# Patient Record
Sex: MALE | Race: WHITE | NOT HISPANIC OR LATINO | URBAN - METROPOLITAN AREA
[De-identification: names, ages, dates, MRNs, and addresses within clinical notes are randomized per-mention and may not be internally consistent; named-entity substitution may affect disease eponyms.]

---

## 2017-02-15 ENCOUNTER — GENERIC CONVERSION - ENCOUNTER (OUTPATIENT)
Dept: OTHER | Facility: OTHER | Age: 82
End: 2017-02-15

## 2017-03-15 ENCOUNTER — ALLSCRIPTS OFFICE VISIT (OUTPATIENT)
Dept: OTHER | Facility: OTHER | Age: 82
End: 2017-03-15

## 2017-03-15 ENCOUNTER — TRANSCRIBE ORDERS (OUTPATIENT)
Dept: ADMINISTRATIVE | Facility: HOSPITAL | Age: 82
End: 2017-03-15

## 2017-03-15 ENCOUNTER — HOSPITAL ENCOUNTER (OUTPATIENT)
Dept: CT IMAGING | Facility: CLINIC | Age: 82
Discharge: HOME | End: 2017-03-15
Payer: COMMERCIAL

## 2017-03-15 ENCOUNTER — APPOINTMENT (OUTPATIENT)
Dept: CT IMAGING | Facility: CLINIC | Age: 82
End: 2017-03-15
Payer: COMMERCIAL

## 2017-03-15 DIAGNOSIS — R22.30 LOCALIZED SWELLING, MASS, OR LUMP OF UPPER EXTREMITY: ICD-10-CM

## 2017-03-15 DIAGNOSIS — R22.30 LOCALIZED SWELLING, MASS AND LUMP, UPPER LIMB, UNSPECIFIED LATERALITY: Primary | ICD-10-CM

## 2017-03-15 PROCEDURE — 71250 CT THORAX DX C-: CPT

## 2017-03-23 ENCOUNTER — ALLSCRIPTS OFFICE VISIT (OUTPATIENT)
Dept: OTHER | Facility: OTHER | Age: 82
End: 2017-03-23

## 2017-04-03 ENCOUNTER — HOSPITAL ENCOUNTER (OUTPATIENT)
Dept: RADIOLOGY | Facility: HOSPITAL | Age: 82
Discharge: HOME | End: 2017-04-03
Attending: SURGERY
Payer: COMMERCIAL

## 2017-04-03 VITALS
OXYGEN SATURATION: 93 % | HEIGHT: 66 IN | BODY MASS INDEX: 23.95 KG/M2 | DIASTOLIC BLOOD PRESSURE: 80 MMHG | HEART RATE: 82 BPM | SYSTOLIC BLOOD PRESSURE: 168 MMHG | TEMPERATURE: 98.4 F | WEIGHT: 149 LBS | RESPIRATION RATE: 16 BRPM

## 2017-04-03 DIAGNOSIS — R22.30: ICD-10-CM

## 2017-04-03 LAB
ERYTHROCYTE [DISTWIDTH] IN BLOOD BY AUTOMATED COUNT: 14.5 % (ref 11.6–15.1)
HCT VFR BLD AUTO: 40.6 % (ref 36.5–49.3)
HGB BLD-MCNC: 13.8 G/DL (ref 12–17)
MCH RBC QN AUTO: 31.5 PG (ref 26.8–34.3)
MCHC RBC AUTO-ENTMCNC: 34 G/DL (ref 31.4–37.4)
MCV RBC AUTO: 93 FL (ref 82–98)
PLATELET # BLD AUTO: 407 THOUSANDS/UL (ref 149–390)
PMV BLD AUTO: 8.9 FL (ref 8.9–12.7)
RBC # BLD AUTO: 4.38 MILLION/UL (ref 3.88–5.62)
WBC # BLD AUTO: 10.5 THOUSAND/UL (ref 4.31–10.16)

## 2017-04-03 PROCEDURE — 88333 PATH CONSLTJ SURG CYTO XM 1: CPT | Performed by: SURGERY

## 2017-04-03 PROCEDURE — 76942 ECHO GUIDE FOR BIOPSY: CPT

## 2017-04-03 PROCEDURE — 88305 TISSUE EXAM BY PATHOLOGIST: CPT | Performed by: SURGERY

## 2017-04-03 PROCEDURE — 38505 NEEDLE BIOPSY LYMPH NODES: CPT

## 2017-04-03 PROCEDURE — 85027 COMPLETE CBC AUTOMATED: CPT | Performed by: RADIOLOGY

## 2017-04-03 RX ORDER — ACETAMINOPHEN 325 MG/1
325 TABLET ORAL EVERY 6 HOURS PRN
Status: DISCONTINUED | OUTPATIENT
Start: 2017-04-03 | End: 2017-04-04 | Stop reason: HOSPADM

## 2017-04-03 RX ORDER — FENTANYL CITRATE 50 UG/ML
INJECTION, SOLUTION INTRAMUSCULAR; INTRAVENOUS CODE/TRAUMA/SEDATION MEDICATION
Status: COMPLETED | OUTPATIENT
Start: 2017-04-03 | End: 2017-04-03

## 2017-04-03 RX ORDER — ALBUTEROL SULFATE 2.5 MG/3ML
2.5 SOLUTION RESPIRATORY (INHALATION) EVERY 6 HOURS PRN
COMMUNITY

## 2017-04-03 RX ORDER — TRIAMCINOLONE ACETONIDE 0.25 MG/G
CREAM TOPICAL 2 TIMES DAILY
COMMUNITY

## 2017-04-03 RX ORDER — ATORVASTATIN CALCIUM 10 MG/1
10 TABLET, FILM COATED ORAL DAILY
COMMUNITY

## 2017-04-03 RX ORDER — SODIUM CHLORIDE 9 MG/ML
75 INJECTION, SOLUTION INTRAVENOUS CONTINUOUS
Status: DISCONTINUED | OUTPATIENT
Start: 2017-04-03 | End: 2017-04-04 | Stop reason: HOSPADM

## 2017-04-03 RX ORDER — MOMETASONE FUROATE 50 UG/1
2 SPRAY, METERED NASAL DAILY
COMMUNITY

## 2017-04-03 RX ORDER — ERYTHROMYCIN 5 MG/G
0.5 OINTMENT OPHTHALMIC
COMMUNITY

## 2017-04-03 RX ORDER — HYDROCHLOROTHIAZIDE 25 MG/1
25 TABLET ORAL DAILY
COMMUNITY

## 2017-04-03 RX ORDER — ALLOPURINOL 300 MG/1
300 TABLET ORAL DAILY
COMMUNITY

## 2017-04-03 RX ADMIN — SODIUM CHLORIDE 75 ML/HR: 0.9 INJECTION, SOLUTION INTRAVENOUS at 07:04

## 2017-04-03 RX ADMIN — FENTANYL CITRATE 50 MCG: 50 INJECTION, SOLUTION INTRAMUSCULAR; INTRAVENOUS at 08:36

## 2017-04-17 ENCOUNTER — HOSPITAL ENCOUNTER (OUTPATIENT)
Dept: RADIOLOGY | Facility: HOSPITAL | Age: 82
Discharge: HOME | End: 2017-04-17
Attending: SURGERY
Payer: COMMERCIAL

## 2017-04-17 ENCOUNTER — HOSPITAL ENCOUNTER (OUTPATIENT)
Dept: NON INVASIVE DIAGNOSTICS | Facility: HOSPITAL | Age: 82
Discharge: HOME | End: 2017-04-17
Attending: SURGERY
Payer: COMMERCIAL

## 2017-04-17 ENCOUNTER — TRANSCRIBE ORDERS (OUTPATIENT)
Dept: ADMINISTRATIVE | Facility: HOSPITAL | Age: 82
End: 2017-04-17

## 2017-04-17 ENCOUNTER — APPOINTMENT (OUTPATIENT)
Dept: LAB | Facility: HOSPITAL | Age: 82
End: 2017-04-17
Attending: SURGERY
Payer: COMMERCIAL

## 2017-04-17 ENCOUNTER — ALLSCRIPTS OFFICE VISIT (OUTPATIENT)
Dept: OTHER | Facility: OTHER | Age: 82
End: 2017-04-17

## 2017-04-17 DIAGNOSIS — R22.30 LOCALIZED SWELLING, MASS AND LUMP, UPPER LIMB, UNSPECIFIED LATERALITY: ICD-10-CM

## 2017-04-17 DIAGNOSIS — R22.30 LOCALIZED SWELLING, MASS AND LUMP, UPPER LIMB, UNSPECIFIED LATERALITY: Primary | ICD-10-CM

## 2017-04-17 LAB
ALBUMIN SERPL BCP-MCNC: 3.9 G/DL (ref 3.5–5)
ALP SERPL-CCNC: 86 U/L (ref 46–116)
ALT SERPL W P-5'-P-CCNC: 23 U/L (ref 12–78)
ANION GAP SERPL CALCULATED.3IONS-SCNC: 5 MMOL/L (ref 4–13)
APTT PPP: 30 SECONDS (ref 24–36)
AST SERPL W P-5'-P-CCNC: 28 U/L (ref 5–45)
ATRIAL RATE: 82 BPM
BASOPHILS # BLD AUTO: 0.03 THOUSANDS/ΜL (ref 0–0.1)
BASOPHILS NFR BLD AUTO: 0 % (ref 0–1)
BILIRUB DIRECT SERPL-MCNC: 0.19 MG/DL (ref 0–0.2)
BILIRUB SERPL-MCNC: 0.6 MG/DL (ref 0.2–1)
BUN SERPL-MCNC: 10 MG/DL (ref 5–25)
CALCIUM SERPL-MCNC: 8.8 MG/DL (ref 8.3–10.1)
CEA SERPL-MCNC: 1.4 NG/ML (ref 0–3)
CHLORIDE SERPL-SCNC: 100 MMOL/L (ref 100–108)
CO2 SERPL-SCNC: 37 MMOL/L (ref 21–32)
CREAT SERPL-MCNC: 1 MG/DL (ref 0.6–1.3)
EOSINOPHIL # BLD AUTO: 0.15 THOUSAND/ΜL (ref 0–0.61)
EOSINOPHIL NFR BLD AUTO: 1 % (ref 0–6)
ERYTHROCYTE [DISTWIDTH] IN BLOOD BY AUTOMATED COUNT: 14.6 % (ref 11.6–15.1)
EST. AVERAGE GLUCOSE BLD GHB EST-MCNC: 114 MG/DL
GFR SERPL CREATININE-BSD FRML MDRD: >60 ML/MIN/1.73SQ M
GLUCOSE SERPL-MCNC: 106 MG/DL (ref 65–140)
HBA1C MFR BLD: 5.6 % (ref 4.2–6.3)
HCT VFR BLD AUTO: 42.1 % (ref 36.5–49.3)
HGB BLD-MCNC: 13.9 G/DL (ref 12–17)
INR PPP: 0.97 (ref 0.86–1.16)
LYMPHOCYTES # BLD AUTO: 1.46 THOUSANDS/ΜL (ref 0.6–4.47)
LYMPHOCYTES NFR BLD AUTO: 12 % (ref 14–44)
MCH RBC QN AUTO: 30.7 PG (ref 26.8–34.3)
MCHC RBC AUTO-ENTMCNC: 33 G/DL (ref 31.4–37.4)
MCV RBC AUTO: 93 FL (ref 82–98)
MONOCYTES # BLD AUTO: 0.72 THOUSAND/ΜL (ref 0.17–1.22)
MONOCYTES NFR BLD AUTO: 6 % (ref 4–12)
NEUTROPHILS # BLD AUTO: 10.38 THOUSANDS/ΜL (ref 1.85–7.62)
NEUTS SEG NFR BLD AUTO: 81 % (ref 43–75)
P AXIS: 73 DEGREES
PLATELET # BLD AUTO: 447 THOUSANDS/UL (ref 149–390)
PMV BLD AUTO: 8.6 FL (ref 8.9–12.7)
POTASSIUM SERPL-SCNC: 3.1 MMOL/L (ref 3.5–5.3)
PR INTERVAL: 174 MS
PROT SERPL-MCNC: 7.4 G/DL (ref 6.4–8.2)
PROTHROMBIN TIME: 12.8 SECONDS (ref 12–14.3)
QRS AXIS: 27 DEGREES
QRSD INTERVAL: 88 MS
QT INTERVAL: 374 MS
QTC INTERVAL: 436 MS
RBC # BLD AUTO: 4.53 MILLION/UL (ref 3.88–5.62)
SODIUM SERPL-SCNC: 142 MMOL/L (ref 136–145)
T WAVE AXIS: 65 DEGREES
VENTRICULAR RATE: 82 BPM
WBC # BLD AUTO: 12.74 THOUSAND/UL (ref 4.31–10.16)

## 2017-04-17 PROCEDURE — 85730 THROMBOPLASTIN TIME PARTIAL: CPT

## 2017-04-17 PROCEDURE — 85610 PROTHROMBIN TIME: CPT

## 2017-04-17 PROCEDURE — 36415 COLL VENOUS BLD VENIPUNCTURE: CPT

## 2017-04-17 PROCEDURE — 80053 COMPREHEN METABOLIC PANEL: CPT

## 2017-04-17 PROCEDURE — 82248 BILIRUBIN DIRECT: CPT

## 2017-04-17 PROCEDURE — 83036 HEMOGLOBIN GLYCOSYLATED A1C: CPT

## 2017-04-17 PROCEDURE — 82378 CARCINOEMBRYONIC ANTIGEN: CPT

## 2017-04-17 PROCEDURE — 93005 ELECTROCARDIOGRAM TRACING: CPT

## 2017-04-17 PROCEDURE — 85025 COMPLETE CBC W/AUTO DIFF WBC: CPT

## 2017-04-17 PROCEDURE — 71020 HB CHEST X-RAY 2VW FRONTAL&LATL: CPT

## 2017-04-24 RX ORDER — HYDROCODONE BITARTRATE AND ACETAMINOPHEN 5; 325 MG/1; MG/1
1 TABLET ORAL EVERY 6 HOURS PRN
COMMUNITY
End: 2018-02-06 | Stop reason: ALTCHOICE

## 2017-04-25 ENCOUNTER — ANESTHESIA EVENT (OUTPATIENT)
Dept: PERIOP | Facility: HOSPITAL | Age: 82
End: 2017-04-25
Payer: COMMERCIAL

## 2017-04-25 ENCOUNTER — ANESTHESIA (OUTPATIENT)
Dept: PERIOP | Facility: HOSPITAL | Age: 82
End: 2017-04-25
Payer: COMMERCIAL

## 2017-04-25 ENCOUNTER — HOSPITAL ENCOUNTER (OUTPATIENT)
Facility: HOSPITAL | Age: 82
Setting detail: OUTPATIENT SURGERY
Discharge: HOME | End: 2017-04-25
Attending: SURGERY | Admitting: SURGERY
Payer: COMMERCIAL

## 2017-04-25 VITALS
RESPIRATION RATE: 16 BRPM | BODY MASS INDEX: 24.11 KG/M2 | SYSTOLIC BLOOD PRESSURE: 152 MMHG | WEIGHT: 150 LBS | HEIGHT: 66 IN | HEART RATE: 91 BPM | TEMPERATURE: 98.5 F | OXYGEN SATURATION: 95 % | DIASTOLIC BLOOD PRESSURE: 70 MMHG

## 2017-04-25 DIAGNOSIS — R22.30 LOCALIZED SWELLING, MASS, OR LUMP OF UPPER EXTREMITY: ICD-10-CM

## 2017-04-25 LAB — POTASSIUM SERPL-SCNC: 5.2 MMOL/L (ref 3.5–5.3)

## 2017-04-25 PROCEDURE — 88331 PATH CONSLTJ SURG 1 BLK 1SPC: CPT | Performed by: SURGERY

## 2017-04-25 PROCEDURE — 88307 TISSUE EXAM BY PATHOLOGIST: CPT | Performed by: SURGERY

## 2017-04-25 PROCEDURE — 84132 ASSAY OF SERUM POTASSIUM: CPT | Performed by: PHYSICIAN ASSISTANT

## 2017-04-25 PROCEDURE — 88305 TISSUE EXAM BY PATHOLOGIST: CPT | Performed by: SURGERY

## 2017-04-25 PROCEDURE — 88333 PATH CONSLTJ SURG CYTO XM 1: CPT | Performed by: SURGERY

## 2017-04-25 RX ORDER — FENTANYL CITRATE/PF 50 MCG/ML
50 SYRINGE (ML) INJECTION
Status: DISCONTINUED | OUTPATIENT
Start: 2017-04-25 | End: 2017-04-25 | Stop reason: HOSPADM

## 2017-04-25 RX ORDER — FENTANYL CITRATE 50 UG/ML
INJECTION, SOLUTION INTRAMUSCULAR; INTRAVENOUS AS NEEDED
Status: DISCONTINUED | OUTPATIENT
Start: 2017-04-25 | End: 2017-04-25 | Stop reason: SURG

## 2017-04-25 RX ORDER — FENTANYL CITRATE/PF 50 MCG/ML
25 SYRINGE (ML) INJECTION
Status: COMPLETED | OUTPATIENT
Start: 2017-04-25 | End: 2017-04-25

## 2017-04-25 RX ORDER — FLUOROURACIL 5 MG/G
CREAM TOPICAL DAILY
COMMUNITY

## 2017-04-25 RX ORDER — ACETAMINOPHEN 325 MG/1
650 TABLET ORAL EVERY 6 HOURS PRN
Status: DISCONTINUED | OUTPATIENT
Start: 2017-04-25 | End: 2017-04-25 | Stop reason: HOSPADM

## 2017-04-25 RX ORDER — ONDANSETRON 2 MG/ML
4 INJECTION INTRAMUSCULAR; INTRAVENOUS EVERY 6 HOURS PRN
Status: DISCONTINUED | OUTPATIENT
Start: 2017-04-25 | End: 2017-04-25 | Stop reason: HOSPADM

## 2017-04-25 RX ORDER — HYDROCODONE BITARTRATE AND ACETAMINOPHEN 5; 325 MG/1; MG/1
1-2 TABLET ORAL EVERY 4 HOURS PRN
Qty: 30 TABLET | Refills: 0 | Status: SHIPPED | OUTPATIENT
Start: 2017-04-25 | End: 2017-05-05

## 2017-04-25 RX ORDER — GLYCOPYRROLATE 0.2 MG/ML
INJECTION INTRAMUSCULAR; INTRAVENOUS AS NEEDED
Status: DISCONTINUED | OUTPATIENT
Start: 2017-04-25 | End: 2017-04-25 | Stop reason: SURG

## 2017-04-25 RX ORDER — SODIUM CHLORIDE, SODIUM LACTATE, POTASSIUM CHLORIDE, CALCIUM CHLORIDE 600; 310; 30; 20 MG/100ML; MG/100ML; MG/100ML; MG/100ML
125 INJECTION, SOLUTION INTRAVENOUS CONTINUOUS
Status: DISCONTINUED | OUTPATIENT
Start: 2017-04-25 | End: 2017-04-25 | Stop reason: HOSPADM

## 2017-04-25 RX ORDER — MIDAZOLAM HYDROCHLORIDE 1 MG/ML
INJECTION INTRAMUSCULAR; INTRAVENOUS AS NEEDED
Status: DISCONTINUED | OUTPATIENT
Start: 2017-04-25 | End: 2017-04-25 | Stop reason: SURG

## 2017-04-25 RX ORDER — PROPOFOL 10 MG/ML
INJECTION, EMULSION INTRAVENOUS AS NEEDED
Status: DISCONTINUED | OUTPATIENT
Start: 2017-04-25 | End: 2017-04-25 | Stop reason: SURG

## 2017-04-25 RX ORDER — HYDROCODONE BITARTRATE AND ACETAMINOPHEN 5; 325 MG/1; MG/1
2 TABLET ORAL EVERY 6 HOURS PRN
Status: DISCONTINUED | OUTPATIENT
Start: 2017-04-25 | End: 2017-04-25 | Stop reason: HOSPADM

## 2017-04-25 RX ORDER — HYDROCODONE BITARTRATE AND ACETAMINOPHEN 5; 325 MG/1; MG/1
1 TABLET ORAL EVERY 6 HOURS PRN
Status: DISCONTINUED | OUTPATIENT
Start: 2017-04-25 | End: 2017-04-25 | Stop reason: HOSPADM

## 2017-04-25 RX ORDER — ONDANSETRON 2 MG/ML
4 INJECTION INTRAMUSCULAR; INTRAVENOUS ONCE
Status: DISCONTINUED | OUTPATIENT
Start: 2017-04-25 | End: 2017-04-25 | Stop reason: HOSPADM

## 2017-04-25 RX ADMIN — MIDAZOLAM HYDROCHLORIDE 2 MG: 1 INJECTION, SOLUTION INTRAMUSCULAR; INTRAVENOUS at 09:43

## 2017-04-25 RX ADMIN — FENTANYL CITRATE 25 MCG: 50 INJECTION, SOLUTION INTRAMUSCULAR; INTRAVENOUS at 09:41

## 2017-04-25 RX ADMIN — SODIUM CHLORIDE, SODIUM LACTATE, POTASSIUM CHLORIDE, AND CALCIUM CHLORIDE 125 ML/HR: .6; .31; .03; .02 INJECTION, SOLUTION INTRAVENOUS at 08:12

## 2017-04-25 RX ADMIN — FENTANYL CITRATE 25 MCG: 50 INJECTION, SOLUTION INTRAMUSCULAR; INTRAVENOUS at 11:41

## 2017-04-25 RX ADMIN — FENTANYL CITRATE 25 MCG: 50 INJECTION, SOLUTION INTRAMUSCULAR; INTRAVENOUS at 11:16

## 2017-04-25 RX ADMIN — GLYCOPYRROLATE 0.2 MG: 0.2 INJECTION, SOLUTION INTRAMUSCULAR; INTRAVENOUS at 09:44

## 2017-04-25 RX ADMIN — FENTANYL CITRATE 25 MCG: 50 INJECTION, SOLUTION INTRAMUSCULAR; INTRAVENOUS at 11:48

## 2017-04-25 RX ADMIN — PROPOFOL 100 MG: 10 INJECTION, EMULSION INTRAVENOUS at 09:44

## 2017-04-25 RX ADMIN — FENTANYL CITRATE 25 MCG: 50 INJECTION, SOLUTION INTRAMUSCULAR; INTRAVENOUS at 11:55

## 2017-04-25 RX ADMIN — FENTANYL CITRATE 25 MCG: 50 INJECTION, SOLUTION INTRAMUSCULAR; INTRAVENOUS at 11:35

## 2017-04-25 RX ADMIN — FENTANYL CITRATE 50 MCG: 50 INJECTION, SOLUTION INTRAMUSCULAR; INTRAVENOUS at 09:54

## 2017-04-25 RX ADMIN — FENTANYL CITRATE 25 MCG: 50 INJECTION, SOLUTION INTRAMUSCULAR; INTRAVENOUS at 10:57

## 2017-04-25 RX ADMIN — FENTANYL CITRATE 25 MCG: 50 INJECTION, SOLUTION INTRAMUSCULAR; INTRAVENOUS at 11:00

## 2017-04-25 RX ADMIN — CEFAZOLIN SODIUM 2000 MG: 2 SOLUTION INTRAVENOUS at 09:46

## 2017-04-25 RX ADMIN — HYDROCODONE BITARTRATE AND ACETAMINOPHEN 1 TABLET: 5; 325 TABLET ORAL at 12:14

## 2017-04-25 RX ADMIN — FENTANYL CITRATE 50 MCG: 50 INJECTION, SOLUTION INTRAMUSCULAR; INTRAVENOUS at 10:47

## 2017-05-08 ENCOUNTER — ALLSCRIPTS OFFICE VISIT (OUTPATIENT)
Dept: OTHER | Facility: OTHER | Age: 82
End: 2017-05-08

## 2017-05-15 ENCOUNTER — ALLSCRIPTS OFFICE VISIT (OUTPATIENT)
Dept: OTHER | Facility: OTHER | Age: 82
End: 2017-05-15

## 2017-06-01 ENCOUNTER — ALLSCRIPTS OFFICE VISIT (OUTPATIENT)
Dept: OTHER | Facility: OTHER | Age: 82
End: 2017-06-01

## 2017-06-01 ENCOUNTER — TRANSCRIBE ORDERS (OUTPATIENT)
Dept: ADMINISTRATIVE | Facility: HOSPITAL | Age: 82
End: 2017-06-01

## 2017-06-01 DIAGNOSIS — R22.30 LOCALIZED SWELLING, MASS, OR LUMP OF UPPER EXTREMITY: ICD-10-CM

## 2017-06-01 DIAGNOSIS — R22.30 LOCALIZED SWELLING, MASS AND LUMP, UPPER LIMB, UNSPECIFIED LATERALITY: Primary | ICD-10-CM

## 2017-06-09 ENCOUNTER — HOSPITAL ENCOUNTER (OUTPATIENT)
Dept: RADIOLOGY | Age: 82
Discharge: HOME | End: 2017-06-09
Payer: COMMERCIAL

## 2017-06-09 DIAGNOSIS — C77.9 METASTATIC SQUAMOUS CELL CARCINOMA TO LYMPH NODE (HCC): ICD-10-CM

## 2017-06-09 LAB — GLUCOSE SERPL-MCNC: 106 MG/DL (ref 65–140)

## 2017-06-09 PROCEDURE — 82948 REAGENT STRIP/BLOOD GLUCOSE: CPT

## 2017-06-09 PROCEDURE — 78815 PET IMAGE W/CT SKULL-THIGH: CPT

## 2017-06-09 PROCEDURE — A9552 F18 FDG: HCPCS

## 2017-06-09 RX ADMIN — IOHEXOL 5 ML: 240 INJECTION, SOLUTION INTRATHECAL; INTRAVASCULAR; INTRAVENOUS; ORAL at 11:10

## 2017-06-22 ENCOUNTER — ALLSCRIPTS OFFICE VISIT (OUTPATIENT)
Dept: OTHER | Facility: OTHER | Age: 82
End: 2017-06-22

## 2017-06-28 ENCOUNTER — LAB CONVERSION - ENCOUNTER (OUTPATIENT)
Dept: OTHER | Facility: OTHER | Age: 82
End: 2017-06-28

## 2017-06-28 LAB
COMMENT (HISTORICAL): NORMAL
DIAGNOSIS (HISTORICAL): NORMAL
GROSS DESCRIPTION (HISTORICAL): NORMAL
PATHOLOGIST (HISTORICAL): NORMAL
PLEASE NOTE (HISTORICAL): NORMAL
PROCEDURE (HISTORICAL): NORMAL
SCREEN/SCREENER (HISTORICAL): NORMAL
SITE/SOURCE (HISTORICAL): NORMAL

## 2017-07-06 ENCOUNTER — ALLSCRIPTS OFFICE VISIT (OUTPATIENT)
Dept: OTHER | Facility: OTHER | Age: 82
End: 2017-07-06

## 2017-07-11 ENCOUNTER — GENERIC CONVERSION - ENCOUNTER (OUTPATIENT)
Dept: OTHER | Facility: OTHER | Age: 82
End: 2017-07-11

## 2017-07-17 ENCOUNTER — GENERIC CONVERSION - ENCOUNTER (OUTPATIENT)
Dept: OTHER | Facility: OTHER | Age: 82
End: 2017-07-17

## 2017-07-20 ENCOUNTER — GENERIC CONVERSION - ENCOUNTER (OUTPATIENT)
Dept: OTHER | Facility: OTHER | Age: 82
End: 2017-07-20

## 2017-07-27 ENCOUNTER — GENERIC CONVERSION - ENCOUNTER (OUTPATIENT)
Dept: OTHER | Facility: OTHER | Age: 82
End: 2017-07-27

## 2017-07-29 ENCOUNTER — TRANSCRIBE ORDERS (OUTPATIENT)
Dept: ADMINISTRATIVE | Facility: HOSPITAL | Age: 82
End: 2017-07-29

## 2017-07-29 ENCOUNTER — APPOINTMENT (OUTPATIENT)
Dept: LAB | Facility: CLINIC | Age: 82
End: 2017-07-29
Payer: COMMERCIAL

## 2017-07-29 DIAGNOSIS — J45.909 EXTRINSIC ASTHMA, UNSPECIFIED: ICD-10-CM

## 2017-07-29 DIAGNOSIS — Z12.5 SPECIAL SCREENING FOR MALIGNANT NEOPLASM OF PROSTATE: ICD-10-CM

## 2017-07-29 DIAGNOSIS — E79.0 URICACIDEMIA: ICD-10-CM

## 2017-07-29 DIAGNOSIS — C79.9 METASTATIC CANCER (HCC): ICD-10-CM

## 2017-07-29 DIAGNOSIS — I10 ESSENTIAL HYPERTENSION, MALIGNANT: ICD-10-CM

## 2017-07-29 DIAGNOSIS — J45.909 EXTRINSIC ASTHMA, UNSPECIFIED: Primary | ICD-10-CM

## 2017-07-29 DIAGNOSIS — E78.5 OTHER AND UNSPECIFIED HYPERLIPIDEMIA: ICD-10-CM

## 2017-07-29 LAB
CHOLEST SERPL-MCNC: 117 MG/DL (ref 50–200)
HDLC SERPL-MCNC: 46 MG/DL (ref 40–60)
LDLC SERPL CALC-MCNC: 47 MG/DL (ref 0–100)
PSA SERPL-MCNC: 8.2 NG/ML (ref 0–4)
TRIGL SERPL-MCNC: 119 MG/DL
URATE SERPL-MCNC: 5 MG/DL (ref 4.2–8)

## 2017-07-29 PROCEDURE — 80061 LIPID PANEL: CPT

## 2017-07-29 PROCEDURE — 84153 ASSAY OF PSA TOTAL: CPT

## 2017-07-29 PROCEDURE — 36415 COLL VENOUS BLD VENIPUNCTURE: CPT

## 2017-07-29 PROCEDURE — 84550 ASSAY OF BLOOD/URIC ACID: CPT

## 2017-08-23 ENCOUNTER — GENERIC CONVERSION - ENCOUNTER (OUTPATIENT)
Dept: OTHER | Facility: OTHER | Age: 82
End: 2017-08-23

## 2017-08-29 ENCOUNTER — ALLSCRIPTS OFFICE VISIT (OUTPATIENT)
Dept: OTHER | Facility: OTHER | Age: 82
End: 2017-08-29

## 2017-08-31 ENCOUNTER — GENERIC CONVERSION - ENCOUNTER (OUTPATIENT)
Dept: OTHER | Facility: OTHER | Age: 82
End: 2017-08-31

## 2017-10-02 ENCOUNTER — ALLSCRIPTS OFFICE VISIT (OUTPATIENT)
Dept: OTHER | Facility: OTHER | Age: 82
End: 2017-10-02

## 2017-10-04 ENCOUNTER — LAB CONVERSION - ENCOUNTER (OUTPATIENT)
Dept: OTHER | Facility: OTHER | Age: 82
End: 2017-10-04

## 2017-10-04 LAB
ADDITIONAL INFORMATION (HISTORICAL): NORMAL
DIAGNOSIS (HISTORICAL): NORMAL
DIAGNOSIS (HISTORICAL): NORMAL
GROSS DESCRIPTION (HISTORICAL): NORMAL
MICRO DESCRIPTION (HISTORICAL): NORMAL
MICRO DESCRIPTION (HISTORICAL): NORMAL
PATHOLOGIST (HISTORICAL): NORMAL
PROCEDURE (HISTORICAL): NORMAL
PROCEDURE (HISTORICAL): NORMAL
SITE/SOURCE (HISTORICAL): NORMAL
SOURCE (HISTORICAL): NORMAL

## 2017-10-05 NOTE — PROGRESS NOTES
Assessment  1  Axillary mass (782 2) (R22 30)    Plan  Axillary mass    · (Q) TISSUE PATHOLOGY; Status:Active; Requested [facility]:[id number];    Perform:[facility]; Order Comments:Specimen A): Skin biopsy of right axillaSpecimen B): Curette biopsy of deep right axilla tissue; RLS:[id number]; Ordered; For:Axillary mass; Ordered By:Jacob Hilton;    Discussion/Summary  Discussion Summary:   Maricruz Reid is an 80year old male who presents today status post excision of right axillary mass done on 4/25/17 for [facility]  The wound had not been packed because he believed it would prevent drainage  Explained that it will not heal properly without packing because it is a deep cavity  The wound was probed and because the fluid and tissue had a cystic quality to it there was concern for recurrent squamous cell cancer, because his cancer also had a cystic component on pathology  He consented to proceed with skin biopsy and deep curettage of his right axilla wound  The tissue appeared friable and a cystic quality to it which further caused concern for squamous cell carcinoma  He will attempt to set up a wound care appointment and to see visiting nurses for further wound care with packing  The possibility of cancer was discussed with the patient and his wife  We will wait for the results of the biopsies, and plan accordingly from the pathology  He knows to contact our office if any problems or concerns arise  Chief Complaint  Chief Complaint Free Text Note Form: Seroma check  History of Present Illness  HPI: Maricruz Reid is an 80year old male who presents today status post excision of right axillary mass done on 4/25/17 for [facility]  He then developed a seroma that was aspirated on 7/6/17 and returns for recheck  He states that the wound exploded open and blew up after a few treatments of chemotherapy on 7/16/17  He claims the wound drained into the sink for a half an hour  He went to [facility], and would care   He spent three days in the hospital and they did not find any infection  At the wound center they have been using gauze topically and have not been packing the wound at his request  The patient is febrile today with a temperature of 100 2 degrees Fahrenheit (checked twice)  He denies any previous fever, chills, or night sweats  also has been receiving chemotherapy and radiation with Dr Katarina Peterson  He is finished with treatment and is scheduled for a PET scan in December  Review of Systems  Complete-Male:   Constitutional: fever, but-as noted in HPI-and-no chills  Eyes: No complaints of eye pain, no red eyes, no discharge from eyes, no itchy eyes  ENT: no complaints of earache, no hearing loss, no nosebleeds, no nasal discharge, no sore throat, no hoarseness  Cardiovascular: No complaints of slow heart rate, no fast heart rate, no chest pain, no palpitations, no leg claudication, no lower extremity  Respiratory: No complaints of shortness of breath, no wheezing, no cough, no SOB on exertion, no orthopnea or PND  Gastrointestinal: No complaints of abdominal pain, no constipation, no nausea or vomiting, no diarrhea or bloody stools  Genitourinary: No complaints of dysuria, no incontinence, no hesitancy, no nocturia, no genital lesion, no testicular pain  Musculoskeletal: No complaints of arthralgia, no myalgias, no joint swelling or stiffness, no limb pain or swelling  Integumentary: skin wound, but-as noted in HPI  Neurological: No compliants of headache, no confusion, no convulsions, no numbness or tingling, no dizziness or fainting, no limb weakness, no difficulty walking  Psychiatric: Is not suicidal, no sleep disturbances, no anxiety or depression, no change in personality, no emotional problems  Endocrine: No complaints of proptosis, no hot flashes, no muscle weakness, no erectile dysfunction, no deepening of the voice, no feelings of weakness     Hematologic/Lymphatic: No complaints of swollen glands, no swollen glands in the neck, does not bleed easily, no easy bruising  ROS Reviewed:   ROS reviewed  Active Problems  1  Allergic rhinitis (477 9) (J30 9)   2  Asthma (493 90) (J45 909)   3  Axillary mass (782 2) (R22 30)   4  Benign essential hypertension (401 1) (I10)   5  Benign Localized Prostatic Hyperplasia Without Urinary Obstruction With Other Lower   Urinary Tract Symptoms (600 20)   6  Chronic obstructive pulmonary disease (496) (J44 9)   7  Hearing Loss (389 9)   8  Hyperlipidemia (272 4) (E78 5)   9  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)   10  Hypokalemia (276 8) (E87 6)   11  Metastatic squamous cell carcinoma (199 1) (C79 9,C80 1)   12  Postherpetic neuralgia (053 19) (B02 29)   13  Prostate cancer screening (V76 44) (Z12 5)   14  Right inguinal hernia (550 90) (K40 90)   15  Seroma, post-traumatic (729 91) (T79 2XXA)   16  Thyroid Nodule    Past Medical History  1  History of Flu vaccine need (V04 81) (Z23)   2  History of Herpes zoster with ophthalmic complication, unspecified herpes zoster eye   disease (053 29) (B02 30)   3  History of goiter (V12 29) (Z86 39)   4  History of thyroid nodule (V12 29) (Z86 39)   5  History of Malignant Skin Neoplasm (V10 83)   6  History of Prostate cancer screening (V76 44) (Z12 5)   7  History of Screen for colon cancer (V76 51) (Z12 11)  Active Problems And Past Medical History Reviewed: The active problems and past medical history were reviewed and updated today  Surgical History  1  History of Cataract Surgery   2  History of Colonoscopic Polypectomy Via Colostomy   3  History of Laminectomy Lumbar   4  History of Partial Colectomy - Sigmoid  Surgical History Reviewed: The surgical history was reviewed and updated today  Family History  Mother    1  Family history of Renal failure  Father    2  Family history of Lung cancer   3   No family history of mental disorder  Family History Reviewed: The family history was reviewed and updated today  Social History   · Being A Social Drinker   · Denies alcohol use causing problems   · Former smoker (V15 82) (L23 532)   · Marital History - Currently    · Occupation: Retired   · Sedentary Lifestyle (V69 0)  Social History Reviewed: The social history was reviewed and updated today  The social history was reviewed and is unchanged  Current Meds   1  Advair -21 MCG/ACT Inhalation Aerosol; INHALE 2 PUFFS,  BY MOUTH, TWICE   DAILY Recorded   2  Allopurinol 300 MG Oral Tablet; take 1 tablet by mouth daily as directed; Therapy: 41XOF4711 to (Sathya Morejon)  Requested for: 17RPA9012; Last   Rx:10Mar2017 Ordered   3  Atorvastatin Calcium 10 MG Oral Tablet; TAKE 1 TABLET DAILY; Therapy: 21CMO4999 to (Sathya Morejon)  Requested for: 48JRS9159; Last   Rx:10Mar2017 Ordered   4  HydroCHLOROthiazide 25 MG Oral Tablet; TAKE 1 TABLET DAILY; Therapy: 07STC9288 to (Sathya Morejon)  Requested for: 39XTY4094; Last   Rx:10Mar2017 Ordered   5  Ipratropium-Albuterol 0 5-2 5 (3) MG/3ML Inhalation Solution; USE 1 UNIT DOSE IN   NEBULIZER EVERY 4 HOURS AS NEEDED Recorded   6  Klor-Con M20 20 MEQ Oral Tablet Extended Release; TAKE 1 TABLET 3 TIMES DAILY; Therapy: (Shannan Sorensen) to Recorded   7  LORazepam 0 5 MG Oral Tablet; 1/2-1 tablet a m  and p m; Therapy: 67JZO2244 to (Evaluate:10Nov2016); Last Rx:11Oct2016 Ordered   8  Magnesium Oxide 400 MG Oral Tablet; TAKE 1 TABLET 3 times daily; Therapy: (Shannan Sorensen) to Recorded   9  Nasonex SUSP; USE 2 SPRAYS IN EACH NOSTRIL ONCE DAILY Recorded   10  TraMADol HCl - 50 MG Oral Tablet; TAKE 1 TABLET Every 6 hours; Therapy: 43FEO6080 to (Evaluate:22Oct2016); Last Rx:30Sep2016 Ordered  Medication List Reviewed: The medication list was reviewed and updated today  Allergies  1  Augmentin TABS   2   Theophyllines    Vitals  Vital Signs    Recorded: 13VUL3806 02: 03PM   Temperature 100 2 F, Tympanic   Heart Rate 113, L Radial   Respiration Quality Normal   Respiration 20   Systolic 879, LUE, Sitting   Diastolic 85, LUE, Sitting   Height 5 ft 6 in   Weight 143 lb 0 2 oz   BMI Calculated 23 08   BSA Calculated 1 73     Physical Exam    Constitutional   General appearance: No acute distress, well appearing and well nourished  Eyes   Conjunctiva and lids: No swelling, erythema, or discharge  Pupils and irises: Equal, round and reactive to light  Sclera non-icteric  Ears, Nose, Mouth, and Throat   External inspection of ears and nose: Normal     Neck   Supple, symmetric, trachea midline, no masses   Pulmonary   Respiratory effort: No increased work of breathing or signs of respiratory distress  Auscultation of lungs: Clear to auscultation, equal breath sounds bilaterally, no wheezes, no rales, no rhonci  Cardiovascular   Auscultation of heart: Normal rate and rhythm, normal S1 and S2, without murmurs  Examination of extremities for edema and/or varicosities: Normal     Carotid pulses: Normal     Abdomen   Abdomen: Non-tender, no masses  Liver and spleen: No hepatomegaly or splenomegaly  Lymphatic   Palpation of lymph nodes in neck: No lymphadenopathy  Musculoskeletal   Digits and nails: Normal without clubbing or cyanosis  Extremities: No clubbing, no cyanosis, no edema   Skin   Skin and subcutaneous tissue: Abnormal  -A small opening at right axilla with swelling to left upper arm area as well as redness  Cystic quality to the tissue and fluid  Neurologic   Sensation: Motor and sensory grossly intact  Psychiatric   Orientation to person, place and time: Normal     Mood and affect: Normal        Procedure    Procedure: skin biopsy  Indications for the procedure include squamous cell carcinoma suspected  Risks, benefits, alternatives, infection risk, bleeding risk and risk of allergic reaction were discussed with the patient   Written consent was obtained prior to the procedure  Procedure Note:   Anesthesia: 5 ml of lidocaine 1% with epinephrine  5 ml of bupivacaine 0 25% without epinephrine  The lesion was located on the right axilla  a curettage biopsy of the lesion was taken  Dressing: The wound was cleaned and a sterile dressing was placed  Specimen: the excised lesion was place in buffered formalin and sent for pathology  Post-Procedure:   Patient Status: the patient tolerated the procedure well  Complications: there were no complications  Follow-up in the office when pathology returns  A skin biopsy of the right axillary skin was performed in addition to a curette of deep axilla  Both were packaged and sent to pathology  The wound was packed with half inch gauze and a dressing was applied  The tissue biopsied appeared to be friable and had a cystic quality to it, which is concerning for squamous cell carcinoma  Health Management  Chronic obstructive pulmonary disease   Complete PFT; every 1 year; Next Due: 37ZUQ7239; Overdue  History of Screen for colon cancer   COLONOSCOPY; every 3 years; Last 98PSE5252; Next Due: 18Fxx5980; Overdue  Health Maintenance   Medicare Annual Wellness Visit; every 1 year; Next Due: [id number]; Overdue    Attending Note  Scribe Attestation:   Scribe Attestation Michelle TILLEY am acting as a scribe in the presence of the attending physician while the attending physician examines the patient  Physician Attestation:   Mady Richard personally performed the services described in this documentation as scribed in my presence, and it is both accurate and complete  Future Appointments    Date/Time Provider Specialty Site   09/06/2018 01:00 PM ROSHAN Merlos   [facility]     Signatures   Electronically signed by : Katelyn Michael MD; Oct  4 2017  2:35PM EST                       (Author)

## 2017-10-13 ENCOUNTER — GENERIC CONVERSION - ENCOUNTER (OUTPATIENT)
Dept: OTHER | Facility: OTHER | Age: 82
End: 2017-10-13

## 2017-12-14 ENCOUNTER — GENERIC CONVERSION - ENCOUNTER (OUTPATIENT)
Dept: OTHER | Facility: OTHER | Age: 82
End: 2017-12-14

## 2017-12-21 ENCOUNTER — GENERIC CONVERSION - ENCOUNTER (OUTPATIENT)
Dept: FAMILY MEDICINE CLINIC | Facility: CLINIC | Age: 82
End: 2017-12-21

## 2018-01-02 ENCOUNTER — ALLSCRIPTS OFFICE VISIT (OUTPATIENT)
Dept: OTHER | Facility: OTHER | Age: 83
End: 2018-01-02

## 2018-01-03 NOTE — PROGRESS NOTES
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 1) (C79 9,C80 1)   · Stage 4 Right Upper Extremity  2  Chronic obstructive pulmonary disease (496) (J44 9)   3  Seroma, post-traumatic (729 91) (T79 2XXA)   4  Benign localized hyperplasia of prostate without urinary obstruction and other lower     urinary tract symptoms (LUTS) (600 20) (N40 0)   5  Allergic rhinitis (477 9) (J30 9)   6  Benign essential hypertension (401 1) (I10)   7  Open wound of axillary region (880 02) (S41 109A)    Plan   Allergic rhinitis    · Mometasone Furoate 50 MCG/ACT Nasal Suspension (Nasonex); USE 2    SPRAYS IN EACH NOSTRIL ONCE DAILY  Benign essential hypertension    · HydroCHLOROthiazide 25 MG Oral Tablet; TAKE 1 TABLET DAILY  Benign localized hyperplasia of prostate without urinary obstruction and other lower    urinary tract symptoms (LUTS)    · Tamsulosin HCl - 0 4 MG Oral Capsule; TAKE 1 CAPSULE Daily  Hyperlipidemia    · Atorvastatin Calcium 10 MG Oral Tablet (Lipitor); TAKE 1 TABLET DAILY  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    · Allopurinol 300 MG Oral Tablet; take 1 tablet by mouth daily as directed  Metastatic squamous cell carcinoma    · From  Oxycodone-Acetaminophen 5-325 MG Oral Tablet take 1-2 tabs every 4    to 6 hours as needed for pain To Oxycodone-Acetaminophen 5-325 MG Oral Tablet TAKE    2 TABLET Every 6 hours    Discussion/Summary      1  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-patient has evidence of advanced disease on his recent PET scan  He is scheduled to follow up with his medical oncologist next week to discuss further options  The patient continues with pain in his arm  We did refill his pain medication as indicated  We will look into additional home services for him since he is becoming increasingly difficult for his wife to care for  We may consider palliative care or hospice at this point   He is going to discuss this more with his oncologist next week  Open right axillary wound-the patient will continue follow-up with wound care as scheduled  COPD-the patient is stable on his current inhalers and will continue follow-up with his pulmonologist  Hypertension-the patient's blood pressure stable on his current medication  BPH-we will try the patient on the generic Flomax as ordered to help with his increasing nocturia  patient will continue on all of his other medications  He will follow up with his specialists as scheduled  We will see him back as scheduled  He will follow up with Medical Oncology next week as scheduled  Possible side effects of new medications were reviewed with the patient/guardian today  The treatment plan was reviewed with the patient/guardian  The patient/guardian understands and agrees with the treatment plan      Chief Complaint   follow up to discuss medications  Patient is here today for follow up of chronic conditions described in HPI  History of Present Illness   Kari Carias is an 49-year-old male who presents today for a routine checkup  He has a history of stage IV squamous cell carcinoma of the right upper extremity, status post resection of a large right axillary metastatic lymph node mass with a postoperative malignant axillary seroma  He did complete radiation therapy with concurrent Erbitux in mid- August 2017  He is currently seeing Dr Azra Garcia, a general surgeon and is also seeing wound care  He continues to see his oncologist, Dr Lb De Leon  He is seeing Dr Yudith Lee next week  has been seeing wound care on a regular basis for his large right axillary wound  has [facility]- she comes once a week  saw Dr No Polanco, his plastic surgeon and they had a repeat PET scan showing terminal cancer- multiple tumors that appear to be metastatic lesion on the anterior chest wall- and he was given 2 years to live  He is interested in hospice at this point   He is seeing his oncologist early next week  was prescribed oxycodone acetaminophen 5/325 mg which she is taking 2 tablets every 4 hours as needed for the pain  He does need a refill today  was seeing Dr Evalee Kawasaki for his BPH  - He is getting up frequently to urinate- he has nocturia every hour  is no chest pain or shortness of breath  There are no palpitations  is chronic shaking and nausea  is having trouble getting out of bed in the am   has trouble getting out of the shower  have been no falls, but he came close  His wife is having trouble caring for him  The patient states he has been stable with his COPD control since the last visit  Symptoms: stable dyspnea on exertion,-- stable exercise intolerance,-- denies coughing,-- denies coughing up sputum,-- denies wheezing-- and-- denies lower extremity edema  The patient is being seen for an initial evaluation of benign prostatic hyperplasia  Symptoms:  sensation of incomplete bladder emptying,-- urinary frequency,-- urinary urgency,-- weak stream-- and-- nocturia, but-- no hesitancy,-- no intermittency,-- no straining,-- no urinary incontinence,-- no post-void dribbling-- and-- no suprapubic pain  No associated symptoms are reported  The patient presents for follow-up of essential hypertension  The patient states he has been stable with his blood pressure control since the last visit  He has no significant interval events  Symptoms: denies impaired vision,-- denies dyspnea,-- denies chest pain,-- denies intermittent leg claudication,-- denies lower extremity edema-- and-- denies   Associated symptoms include no headache,-- no focal neurologic deficits-- and-- no memory loss  Review of Systems        Constitutional: as noted in HPI  Eyes: as noted in HPI       ENT: as noted in HPI  Cardiovascular: as noted in HPI  Respiratory: as noted in HPI  Gastrointestinal: as noted in HPI  Genitourinary: as noted in HPI        Musculoskeletal: as noted in HPI  Integumentary: as noted in HPI  Neurological: as noted in HPI  Psychiatric: as noted in HPI  Active Problems   1  Allergic rhinitis (477 9) (J30 9)   2  Asthma (493 90) (J45 909)   3  Axillary mass (782 2) (R22 30)   4  Benign essential hypertension (401 1) (I10)   5  Benign localized hyperplasia of prostate without urinary obstruction and other lower     urinary tract symptoms (LUTS) (600 20) (N40 0)   6  Chronic obstructive pulmonary disease (496) (J44 9)   7  Hearing Loss (389 9)   8  Hyperlipidemia (272 4) (E78 5)   9  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)   10  Hypokalemia (276 8) (E87 6)   11  Metastatic squamous cell carcinoma (199 1) (C79 9,C80 1)   12  Open wound of axillary region (880 02) (S41 109A)   13  Postherpetic neuralgia (053 19) (B02 29)   14  Prostate cancer screening (V76 44) (Z12 5)   15  Right inguinal hernia (550 90) (K40 90)   16  Seroma, post-traumatic (729 91) (T79 2XXA)   17  Thyroid Nodule    Past Medical History   1  History of Flu vaccine need (V04 81) (Z23)   2  History of Herpes zoster with ophthalmic complication, unspecified herpes zoster eye     disease (053 29) (B02 30)   3  History of goiter (V12 29) (Z86 39)   4  History of thyroid nodule (V12 29) (Z86 39)   5  History of Malignant Skin Neoplasm (V10 83)   6  History of Prostate cancer screening (V76 44) (Z12 5)   7  History of Screen for colon cancer (V76 51) (Z12 11)     The active problems and past medical history were reviewed and updated today  Surgical History   1  History of Cataract Surgery   2  History of Colonoscopic Polypectomy Via Colostomy   3  History of Laminectomy Lumbar   4  History of Partial Colectomy - Sigmoid     The surgical history was reviewed and updated today  Family History   Mother    1  Family history of Renal failure  Father    2  Family history of Lung cancer   3   No family history of mental disorder The family history was reviewed and updated today  Social History    · Being A Social Drinker   · Denies alcohol use causing problems   · Former smoker (V15 82) (L66 795)   · Marital History - Currently    · Occupation: Retired   · Sedentary Lifestyle (V69 0)  The social history was reviewed and updated today  The social history was reviewed and is unchanged  Current Meds    1  Advair -21 MCG/ACT Inhalation Aerosol; INHALE 2 PUFFS,  BY MOUTH, TWICE     DAILY Recorded   2  Allopurinol 300 MG Oral Tablet; take 1 tablet by mouth daily as directed; Therapy: 18OEC3700 to (Gracy Part)  Requested for: 02Mxx6506; Last     Rx:10Mar2017 Ordered   3  Atorvastatin Calcium 10 MG Oral Tablet; TAKE 1 TABLET DAILY; Therapy: 37WXJ0880 to (Gracy Part)  Requested for: 97BPQ9972; Last     Rx:10Mar2017 Ordered   4  HydroCHLOROthiazide 25 MG Oral Tablet; TAKE 1 TABLET DAILY; Therapy: 77NLL4989 to (Gracy Part)  Requested for: 79Icw8791; Last     Rx:10Mar2017 Ordered   5  Ipratropium-Albuterol 0 5-2 5 (3) MG/3ML Inhalation Solution; USE 1 UNIT DOSE IN     NEBULIZER EVERY 4 HOURS AS NEEDED Recorded   6  Klor-Con M20 20 MEQ Oral Tablet Extended Release; TAKE 1 TABLET 3 TIMES DAILY; Therapy: (Noam Pinedo) to Recorded   7  Magnesium Oxide 400 MG Oral Tablet; TAKE 1 TABLET 3 times daily; Therapy: (Noam Pinedo) to Recorded   8  Nasonex SUSP; USE 2 SPRAYS IN EACH NOSTRIL ONCE DAILY Recorded   9  Oxycodone-Acetaminophen 5-325 MG Oral Tablet; take 1-2 tabs every 4 to 6 hours as     needed for pain; Therapy: (Recorded:02Jan2018) to Recorded    Allergies   1  Augmentin TABS   2   Theophyllines    Vitals   Vital Signs    Recorded: 18KTO0301 09:07AM   Temperature 97 1 F, Tympanic   Heart Rate 90   Respiration 18   Systolic 504, LUE, Sitting   Diastolic 74, LUE, Sitting   Height 5 ft 6 in   Weight 133 lb    BMI Calculated 21 47   BSA Calculated 1 68     Physical Exam Constitutional      General appearance: Abnormal   uncomfortable-- and-- appears tired  Ears, Nose, Mouth, and Throat      External inspection of ears and nose: Normal        Otoscopic examination: Tympanic membrance translucent with normal light reflex  Canals patent without erythema  Nasal mucosa, septum, and turbinates: Normal without edema or erythema  Oropharynx: Normal with no erythema, edema, exudate or lesions  Pulmonary      Respiratory effort: No increased work of breathing or signs of respiratory distress  Auscultation of lungs: Clear to auscultation, equal breath sounds bilaterally, no wheezes, no rales, no rhonci  Auscultation of the lungs revealed no expiratory wheezing,-- normal expiratory time-- and-- no inspiratory wheezing  no rales or crackles were heard bilaterally  no rhonchi  no friction rub  no wheezing  no diminished breath sounds  no bronchial breath sounds  Cardiovascular      Palpation of heart: Normal PMI, no thrills  Auscultation of heart: Normal rate and rhythm, normal S1 and S2, without murmurs  Examination of extremities for edema and/or varicosities: Normal        Carotid pulses: Normal        Abdomen      Abdomen: Non-tender, no masses  Liver and spleen: No hepatomegaly or splenomegaly  Lymphatic      Palpation of lymph nodes in neck: No lymphadenopathy  Musculoskeletal      Gait and station: Normal        Digits and nails: Normal without clubbing or cyanosis  Inspection/palpation of joints, bones, and muscles: Normal        Skin      Skin and subcutaneous tissue: Abnormal  -- Patient has a dressing in place over his right axilla  Health Management   Chronic obstructive pulmonary disease   Complete PFT; every 1 year; Next Due: 67KVR2892; Overdue  History of Screen for colon cancer   COLONOSCOPY; every 3 years; Last 30TMS6396; Next Due: 69Cmx6354;  Lester Morfin2 Maintenance   Medicare Annual Wellness Visit; every 1 year; Next Due: [id number];  Overdue    Signatures    Electronically signed by : Alanna Sandoval DO; Yvon  3 2018  4:52AM EST                       (Author)

## 2018-01-05 ENCOUNTER — GENERIC CONVERSION - ENCOUNTER (OUTPATIENT)
Dept: OTHER | Facility: OTHER | Age: 83
End: 2018-01-05

## 2018-01-12 VITALS
HEIGHT: 66 IN | RESPIRATION RATE: 20 BRPM | BODY MASS INDEX: 22.98 KG/M2 | SYSTOLIC BLOOD PRESSURE: 152 MMHG | TEMPERATURE: 100.2 F | HEART RATE: 113 BPM | WEIGHT: 143.01 LBS | DIASTOLIC BLOOD PRESSURE: 85 MMHG

## 2018-01-12 VITALS
DIASTOLIC BLOOD PRESSURE: 80 MMHG | BODY MASS INDEX: 25.16 KG/M2 | WEIGHT: 151 LBS | RESPIRATION RATE: 15 BRPM | HEART RATE: 77 BPM | HEIGHT: 65 IN | TEMPERATURE: 97.9 F | SYSTOLIC BLOOD PRESSURE: 150 MMHG

## 2018-01-12 VITALS — WEIGHT: 148.03 LBS | HEIGHT: 66 IN | TEMPERATURE: 98.4 F | BODY MASS INDEX: 23.79 KG/M2

## 2018-01-12 NOTE — MISCELLANEOUS
Message   Recorded as Task   Date: 11/02/2016 02:27 PM, Created By: Heather Roy   Task Name: Medical Complaint Callback   Assigned To: Vicky Hopkins   Regarding Patient: Clay Peguero, Status: Active   Comment: Heather Roy - 37 Nov 2016 2:27 PM     TASK CREATED  Caller: Self; Medical Complaint; [phone] (Home)  PT IS HAVING SOME AFTER EFFECTS OF THE SHINGLES CAN HE HAVE A FLU SHOT? ? Mia Garcia 17 Nov 2016 2:30 PM     TASK EDITED                he should make an appt to see me first Gonsalves,April - 02 Nov 2016 2:54 PM     TASK EDITED       patient was informed, they are making an appt to come in  Active Problems    1  Allergic rhinitis (477 9) (J30 9)   2  Asthma (493 90) (J45 909)   3  Benign essential hypertension (401 1) (I10)   4  Benign Localized Prostatic Hyperplasia Without Urinary Obstruction With Other Lower   Urinary Tract Symptoms (600 20)   5  Chronic obstructive pulmonary disease (496) (J44 9)   6  Hearing Loss (389 9)   7  Herpes zoster with ophthalmic complication, unspecified herpes zoster eye disease   (053 29) (B02 30)   8  Hyperlipidemia (272 4) (E78 5)   9  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)   10  Hypokalemia (276 8) (E87 6)   11  Postherpetic neuralgia (053 19) (B02 29)   12  Thyroid Nodule    Current Meds   1  Advair -21 MCG/ACT Inhalation Aerosol; INHALE 2 PUFFS,  BY MOUTH, TWICE   DAILY Recorded   2  Allopurinol 300 MG Oral Tablet; take 1 tablet by mouth daily as directed; Therapy: 37NFU5437 to (Evaluate:15Mar2017)  Requested for: 83RPV6698; Last   Rx:18Jun2016 Ordered   3  Atorvastatin Calcium 10 MG Oral Tablet (Lipitor); TAKE 1 TABLET DAILY; Therapy: 90ZWA0802 to (Evaluate:15Mar2017)  Requested for: 40XOO1442; Last   Rx:18Jun2016 Ordered   4  HydroCHLOROthiazide 25 MG Oral Tablet; TAKE 1 TABLET DAILY;    Therapy: 96MQC9100 to (Evaluate:15Mar2017)  Requested for: 01DQJ1875; Last Rx: 52FMV6781 Ordered   5  Ipratropium-Albuterol 0 5-2 5 (3) MG/3ML Inhalation Solution; USE 1 UNIT DOSE IN   NEBULIZER EVERY 4 HOURS AS NEEDED Recorded   6  LORazepam 0 5 MG Oral Tablet; 1/2-1 tablet a m  and p m; Therapy: 68MEW8051 to (Evaluate:10Nov2016); Last Rx:11Oct2016 Ordered   7  Nasonex SUSP (Mometasone Furoate); USE 2 SPRAYS IN EACH NOSTRIL ONCE   DAILY Recorded   8  TraMADol HCl - 50 MG Oral Tablet; TAKE 1 TABLET Every 6 hours; Therapy: 70PIE1086 to (Evaluate:22Oct2016); Last Rx:30Sep2016 Ordered    Allergies    1  Augmentin TABS   2   Theophyllines    Signatures   Electronically signed by : Trinh Duvall, ; Nov 2 2016  2:54PM EST                       (Author)

## 2018-01-12 NOTE — PROGRESS NOTES
Assessment    1  Encounter for preventive health examination (V70 0) (Z00 00)    Plan  Health Maintenance    · Call [phone] if: You have any warning signs of skin cancer ; Status:Complete;    Done: 61CSE9618   · Call 911 if: You experience a new kind of chest pain (angina) or pressure ;  Status:Complete;   Done: 38NJO3522   · Always use a seat belt and shoulder strap when riding or driving a motor vehicle ;  Status:Complete;   Done: 37JQU9868   · Brush your teeth freq1 and floss at least once a day ; Status:Complete;   Done:  80IFV2667   · Eat a low fat and low cholesterol diet ; Status:Complete;   Done: 11XRB9937   · These are things you can do to prevent falls in and around the home ; Status:Complete;    Done: 96TFN3164   · Use a sun block product with an SPF of 15 or more ; Status:Complete;   Done:  22OPR3112   · We recommend that you follow the "Mediterranean diet "; Status:Complete;   Done:  67GFU5683   · Follow-up visit in 6 months Evaluation and Treatment  Follow-up  Status: Hold For -  Scheduling  Requested for: 17Xnf0990    Discussion/Summary    80-year-old man here today for subsequent annual wellness visit  Examination performed no new findings of significance  I reviewed his recent labs EKG colonoscopy pulmonary and neurologic consultations  He reviewed his medications we'll make no changes  He is had all the usual preventive services and immunizations  Will reappoint in 6 months for checkup 12 months for annual wellness visit  Impression: Subsequent Annual Wellness Visit  Cardiovascular screening and counseling: the risks and benefits of screening were discussed and screening is current  Diabetes screening and counseling: the risks and benefits of screening were discussed and screening is current  Colorectal cancer screening and counseling: the risks and benefits of screening were discussed and screening is current     Prostate cancer screening and counseling: the risks and benefits of screening were discussed and screening is current  Osteoporosis screening and counseling: the risks and benefits of screening were discussed and screening is current  Abdominal aortic aneurysm screening and counseling: the risks and benefits of screening were discussed and screening is current  Glaucoma screening and counseling: the risks and benefits of screening were discussed and screening is current  HIV screening and counseling: the risks and benefits of screening were discussed and screening not indicated  Immunizations: the risks and benefits of influenza vaccination were discussed with the patient, the patient declines the influenza vaccination, the risks and benefits of pneumococcal vaccination were discussed with the patient, pneumococcal vaccine due today, hepatitis B prevention counseling was provided, hepatitis B vaccination series is not indicated at this time due to the patient's low risk of pamela the disease, the risks and benefits of the Zostavax vaccine were discussed with the patient, Zostavax vaccination up to date, the risks and benefits of the Tdap vaccine were discussed with the patient and Tdap vaccination up to date  Advance Directive Planning: complete and up to date  Patient Discussion: plan discussed with the patient, follow-up visit needed in   Chief Complaint  Medicare Wellness Exam, 81 yo  Due for depression and fall risk screening  Last pneumococcal vaccine in 2009  Advance Directives  Advance Directive [facility]:   The patient has a living will located   Alternate Health Care Proxy:    Name: Jose Lewis   Relationship: Spouse   Capacity/Competence: This patient has full decision making capacity for discussion of advance care planning  History of Present Illness  HPI: 71-year-old man here today for subsequent annual wellness visit he follows along with the pulmonary doctor the vascular doctor the urologist   Welcome to Medicare and Wellness Visits:  The patient is being seen for the subsequent annual wellness visit  Medicare Screening and Risk Factors   Hospitalizations: no previous hospitalizations  Once per lifetime medicare screening tests: ECG (7/25/16/SHORT AR BORDERLINE) and AAA screening US (7/17/13/WNL)  Medicare Screening Tests Risk Questions   Abdominal aortic aneurysm risk assessment: none indicated  Osteoporosis risk assessment: none indicated  HIV risk assessment: none indicated  Drug and Alcohol Use: The patient is a former cigarette smoker  The patient reports occasional alcohol use  He has never used illicit drugs  Diet and Physical Activity: Current diet includes well balanced meals  He exercises 5 times per week  Exercise: walking, 5HRS 5 hours per week  Mood Disorder and Cognitive Impairment Screening: Anxiety screening NONE  He denies feeling down, depressed, or hopeless over the past two weeks  He denies feeling little interest or pleasure in doing things over the past two weeks  Cognitive impairment screening: denies difficulty learning/retaining new information, denies difficulty handling complex tasks, denies difficulty with reasoning, denies difficulty with spatial ability and orientation and denies difficulty with language  (NONE)   Functional Ability/Level of Safety: Hearing is normal bilaterally  The patient is currently able to do activities of daily living without limitations, able to do instrumental activities of daily living without limitations, able to participate in social activities without limitations and able to drive without limitations  Activities of daily living details: does not need help using the phone, no transportation help needed, does not need help shopping, no meal preparation help needed, does not need help doing housework, does not need help doing laundry, does not need help managing medications and does not need help managing money  Fall risk factors:   The patient fell 0 times in the past 12 months , no polypharmacy, no alcohol use, no mobility impairment, no antidepressant use, no deconditioning, no postural hypotension, no sedative use, no visual impairment, no urinary incontinence, no antihypertensive use, no cognitive impairment, up and go test was normal and no previous fall  Home safety risk factors:  no unfamiliar surroundings, no loose rugs, no poor household lighting, no uneven floors, no household clutter and grab bars in the bathroom  Advance Directives: Advance directives: living will, durable power of  for health care directives and advance directives  end of life decisions were reviewed with the patient and I agree with the patient's decisions  Co-Managers and Medical Equipment/Suppliers: See Patient Care Team      Review of Systems    Constitutional: negative  Head and Face: negative  Eyes: negative  ENT: negative  Cardiovascular: negative  Respiratory: negative  Gastrointestinal: negative  Genitourinary: negative  Musculoskeletal: diffuse joint pain  Integumentary and Breasts: negative  Neurological: negative  Psychiatric: negative  Endocrine: negative  Hematologic and Lymphatic: negative  Active Problems    1  Allergic rhinitis (477 9) (J30 9)   2  Asthma (493 90) (J45 909)   3  Benign essential hypertension (401 1) (I10)   4  Benign Localized Prostatic Hyperplasia Without Urinary Obstruction With Other Lower   Urinary Tract Symptoms (600 20)   5  Chronic obstructive pulmonary disease (496) (J44 9)   6  Hearing Loss (389 9)   7  Hyperlipidemia (272 4) (E78 5)   8  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)   9  Hypokalemia (276 8) (E87 6)   10   Thyroid Nodule    Past Medical History    · History of Flu vaccine need (V04 81) (Z23)   · History of goiter (V12 29) (Z86 39)   · History of thyroid nodule (V12 29) (Z86 39)   · History of Malignant Skin Neoplasm (V10 83)   · History of Prostate cancer screening (V76 44) (Z12 5)   · History of Screen for colon cancer (V76 51) (Z12 11)    The active problems and past medical history were reviewed and updated today  Surgical History    · History of Cataract Surgery   · History of Colonoscopic Polypectomy Via Colostomy   · History of Laminectomy Lumbar   · History of Partial Colectomy - Sigmoid    The surgical history was reviewed and updated today  Family History  Mother    · Family history of Renal failure  Father    · Family history of Lung cancer    The family history was reviewed and updated today  Social History    · Being A Social Drinker   · Former smoker (V15 82) (Z81 863)   · Marital History - Currently    · Occupation: Retired   · Sedentary Lifestyle (V69 0)  The social history was reviewed and updated today  The social history was reviewed and is unchanged  Current Meds   1  Advair -21 MCG/ACT Inhalation Aerosol; INHALE 2 PUFFS,  BY MOUTH, TWICE   DAILY Recorded   2  Allopurinol 300 MG Oral Tablet; take 1 tablet by mouth daily as directed; Therapy: 47JHI1843 to (Evaluate:15Mar2017)  Requested for: 62YHR0776; Last   Rx:93Xkt0275 Ordered   3  Atorvastatin Calcium 10 MG Oral Tablet; TAKE 1 TABLET DAILY; Therapy: 20PDK2774 to (Evaluate:15Mar2017)  Requested for: 60BFH5511; Last   Rx:20Aoy9112 Ordered   4  Hydrochlorothiazide 25 MG Oral Tablet; TAKE 1 TABLET DAILY; Therapy: 81SSF6843 to (Evaluate:15Mar2017)  Requested for: 51VXM4582; Last   Rx:34Wqa7794 Ordered   5  Ipratropium-Albuterol 0 5-2 5 (3) MG/3ML Inhalation Solution; USE 1 UNIT DOSE IN   NEBULIZER EVERY 4 HOURS AS NEEDED Recorded   6  Nasonex SUSP; USE 2 SPRAYS IN EACH NOSTRIL ONCE DAILY Recorded    The medication list was reviewed and updated today  Allergies    1  Augmentin TABS   2   Theophyllines    Immunizations   1 2    Influenza  16-Oct-2015     PPSV  25-Oct-2004 15-Yvon-2009    Td/DT  18-Aug-2006     Zoster  03-Dec-2013      Vitals  Signs Systolic: 594, LUE, Sitting  Diastolic: 70, LUE, Sitting  Heart Rate: 85  Respiration: 15  Temperature: 97 9 F, Tympanic  Height: 5 ft 5 in  Weight: 151 lb 6 08 oz  BMI Calculated: 25 19  BSA Calculated: 1 76    Physical Exam    Constitutional   General appearance: No acute distress, well appearing and well nourished  Head and Face   Head and face: Normal     Palpation of the face and sinuses: No sinus tenderness  Eyes   Conjunctiva and lids: No erythema, swelling or discharge  Pupils and irises: Equal, round, reactive to light  Ears, Nose, Mouth, and Throat   External inspection of ears and nose: Normal     Otoscopic examination: Tympanic membranes translucent with normal light reflex  Canals patent without erythema  Hearing: Normal     Nasal mucosa, septum, and turbinates: Normal without edema or erythema  Lips, teeth, and gums: Normal, good dentition  Oropharynx: Normal with no erythema, edema, exudate or lesions  Neck   Neck: Supple, symmetric, trachea midline, no masses  Thyroid: Normal, no thyromegaly  Pulmonary   Auscultation of lungs: Clear to auscultation  Cardiovascular   Auscultation of heart: Normal rate and rhythm, normal S1 and S2, no murmurs  Carotid pulses: 2+ bilaterally  Abdominal aorta: Normal     Femoral pulses: 2+ bilaterally  Pedal pulses: 2+ bilaterally  Peripheral vascular exam: Normal     Examination of extremities for edema and/or varicosities: Normal     Chest   Breasts: Normal, no dimpling or skin changes appreciated  Palpation of breasts and axillae: Normal, no masses palpated  Abdomen   Abdomen: Non-tender, no masses  Liver and spleen: No hepatomegaly or splenomegaly  Examination for hernias: No hernias appreciated  Genitourinary   Scrotal contents: Normal testes, no masses  Penis: Normal, no lesions  Lymphatic   Palpation of lymph nodes in neck: No lymphadenopathy      Palpation of lymph nodes in axillae: No lymphadenopathy  Palpation of lymph nodes in groin: No lymphadenopathy  Palpation of lymph nodes in other areas: No lymphadenopathy  Musculoskeletal   Gait and station: Normal     Inspection/palpation of digits and nails: Normal without clubbing or cyanosis  Inspection/palpation of joints, bones, and muscles: Normal     Range of motion: Normal     Stability: Normal     Muscle strength/tone: Normal     Skin   Skin and subcutaneous tissue: Normal without rashes or lesions  Palpation of skin and subcutaneous tissue: Normal turgor  Procedure    Procedure: Visual Acuity Test    Inforrmation supplied by a Snellen chart  Patient unable to complete eye exam       Health Management  Chronic obstructive pulmonary disease   Complete PFT; every 1 year; Next Due: 40WUK2886; Overdue  History of Screen for colon cancer   COLONOSCOPY; every 3 years; Last 28ONC3043; Next Due: 85Xdh1878; Active  Health Maintenance   Medicare Annual Wellness Visit; every 1 year; Next Due: F994292;  Overdue    Signatures   Electronically signed by : Woodward Dancer, M D ; Aug  3 2016  1:29PM EST                       (Author)

## 2018-01-13 VITALS — TEMPERATURE: 97.2 F | BODY MASS INDEX: 24.27 KG/M2 | HEIGHT: 66 IN | WEIGHT: 151 LBS

## 2018-01-13 VITALS — TEMPERATURE: 99.4 F | BODY MASS INDEX: 24.11 KG/M2 | HEIGHT: 66 IN | WEIGHT: 150.02 LBS

## 2018-01-13 VITALS
DIASTOLIC BLOOD PRESSURE: 62 MMHG | TEMPERATURE: 99.3 F | SYSTOLIC BLOOD PRESSURE: 130 MMHG | HEIGHT: 66 IN | HEART RATE: 96 BPM | WEIGHT: 148.05 LBS | RESPIRATION RATE: 16 BRPM | BODY MASS INDEX: 23.79 KG/M2

## 2018-01-13 NOTE — PROGRESS NOTES
Assessment    1  Encounter for preventive health examination (V70 0) (Z00 00)    Plan  Health Maintenance    · Call [phone] if: You have any warning signs of skin cancer ; Status:Complete;    Done: 89UPE7507   · Call 911 if: You experience a new kind of chest pain (angina) or pressure ;  Status:Complete;   Done: 58SUE1421   · Always use a seat belt and shoulder strap when riding or driving a motor vehicle ;  Status:Complete;   Done: 58AOR2045   · Brush your teeth {freq1} and floss at least once a day ; Status:Complete;   Done:  07QDX5199   · Eat a low fat and low cholesterol diet ; Status:Complete;   Done: 65DNC3914   · These are things you can do to prevent falls in and around the home ; Status:Complete;    Done: 15HHF9183   · Use a sun block product with an SPF of 15 or more ; Status:Complete;   Done:  91OGS1460   · We recommend that you follow the "Mediterranean diet "; Status:Complete;   Done:  90RNJ1838    Discussion/Summary    49-year-old man here today for subsequent annual wellness visit  All things considered he looks okay  He is had all the usual preventive services I reviewed his recent labs  He has been advised to have a colonoscopy he is postponing this until he feels stronger recovering from his chemotherapy treatments  He declines any immunizations at this time  He declines prostate examination  I reviewed his medications  He sees the oncologist and radiation therapist dermatologist and the pulmonologist  We'll make no changes in his overall medication or management will reappoint in 6 months for checkup 12 months for annual wellness visit  Impression: Subsequent Annual Wellness Visit  Cardiovascular screening and counseling: the risks and benefits of screening were discussed and screening is current  Diabetes screening and counseling: the risks and benefits of screening were discussed and screening is current     Colorectal cancer screening and counseling: the risks and benefits of screening were discussed, due for a colonoscopy (high risk) and Patient has had numerous polyps and has a sigmoid resection  He is about due for follow-up colonoscopy and he will ago when he feels that he is recovered from his recent procedures and chemotherapy  Prostate cancer screening and counseling: the risks and benefits of screening were discussed, screening is current and Patient has a high PSA has had biopsies in the past his PSA is no higher than before and he is not interested in a prostate examination at this time  Osteoporosis screening and counseling: the risks and benefits of screening were discussed and the patient declines screening  Abdominal aortic aneurysm screening and counseling: the risks and benefits of screening were discussed and screening is current  Glaucoma screening and counseling: the risks and benefits of screening were discussed and screening is current  HIV screening and counseling: the risks and benefits of screening were discussed and screening not indicated  Hepatitis C Screening: the patient was counseled on Hepatitis C screening  The patient declinesHepatitis C screening  Immunizations: the patient declines the influenza vaccination, the patient declines the pneumococcal vaccination, the patient declines the hepatitis vaccination series, Zostavax vaccination up to date and the patient declines the Tdap vaccine  Advance Directive Planning: complete and up to date  Patient Discussion: follow-up visit needed in 6mo  Chief Complaint  Subsequent Medicare Wellness Exam 80years old  Advance Directives  Advance Directive [facility]:   YES - Patient has an advance health care directive  The patient has a living will located  a scanned copy is in the patient's chart  Durable Power of  For Healthcare:    Name: Marika Chaparro   Relationship: Wife      History of Present Illness  Welcome to [employer] and Wellness Visits:  The patient is being seen for the subsequent annual wellness visit  Medicare Screening and Risk Factors   Hospitalizations: he has been previously hospitalizied and Surgery within the last 3 months left axillary mass  Once per lifetime medicare screening tests: ECG (April 17 17 EKG normal) and AAA screening US (July 1, 2013 ultrasound no abdominal aortic aneurysm)  Medicare Screening Tests Risk Questions   Abdominal aortic aneurysm risk assessment: none indicated  Osteoporosis risk assessment: none indicated  HIV risk assessment: none indicated  Drug and Alcohol Use: The patient has never smoked cigarettes  The patient reports occasional alcohol use  He has never used illicit drugs  Diet and Physical Activity: Current diet includes well balanced meals  The patient does not exercise  Mood Disorder and Cognitive Impairment Screening: Anxiety screening score was none  He denies feeling down, depressed, or hopeless over the past two weeks  He denies feeling little interest or pleasure in doing things over the past two weeks  Cognitive impairment screening: denies difficulty learning/retaining new information, denies difficulty handling complex tasks, denies difficulty with reasoning, denies difficulty with spatial ability and orientation, denies difficulty with language and denies difficulty with behavior  Functional Ability/Level of Safety: Hearing is normal bilaterally  The patient is currently able to do activities of daily living without limitations, able to do instrumental activities of daily living without limitations, able to participate in social activities without limitations and able to drive without limitations  Activities of daily living details: does not need help using the phone, no transportation help needed, does not need help shopping, no meal preparation help needed, does not need help doing housework, does not need help doing laundry, does not need help managing medications and does not need help managing money     Fall risk factors: The patient fell 0 times in the past 12 months  Injury History: no polypharmacy, no alcohol use, no mobility impairment, no antidepressant use, no deconditioning, no postural hypotension, no sedative use, no visual impairment, no urinary incontinence, no antihypertensive use, no cognitive impairment, up and go test was normal and no previous fall  Home safety risk factors:  no unfamiliar surroundings, no loose rugs, no poor household lighting, no uneven floors, no household clutter, grab bars in the bathroom and handrails on the stairs  Advance Directives: Advance directives: living will, durable power of  for health care directives and advance directives  end of life decisions were reviewed with the patient and I agree with the patient's decisions  Co-Managers and Medical Equipment/Suppliers: See Patient Care Team   Reviewed Updated [facility]:   Last Medicare Wellness Visit Information was reviewed, patient interviewed, no change since last AWV  Review of Systems    Constitutional: negative  Eyes: negative  ENT: negative  Cardiovascular: negative  Respiratory: negative  Gastrointestinal: negative  Genitourinary: negative  Musculoskeletal: negative  Integumentary and Breasts: negative  Neurological: negative  Psychiatric: negative  Endocrine: negative  Hematologic and Lymphatic: negative  Active Problems    1  Allergic rhinitis (477 9) (J30 9)   2  Asthma (493 90) (J45 909)   3  Axillary mass (782 2) (R22 30)   4  Benign essential hypertension (401 1) (I10)   5  Benign Localized Prostatic Hyperplasia Without Urinary Obstruction With Other Lower   Urinary Tract Symptoms (600 20)   6  Chronic obstructive pulmonary disease (496) (J44 9)   7  Hearing Loss (389 9)   8  Hyperlipidemia (272 4) (E78 5)   9  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)   10  Hypokalemia (276 8) (E87 6)   11   Metastatic squamous cell carcinoma (199 1) (C79 9,C80 1)   12  Postherpetic neuralgia (053 19) (B02 29)   13  Prostate cancer screening (V76 44) (Z12 5)   14  Right inguinal hernia (550 90) (K40 90)   15  Seroma, post-traumatic (729 91) (T79 2XXA)   16  Thyroid Nodule    Past Medical History    · History of Flu vaccine need (V04 81) (Z23)   · History of Herpes zoster with ophthalmic complication, unspecified herpes zoster eye  disease (053 29) (B02 30)   · History of goiter (V12 29) (Z86 39)   · History of thyroid nodule (V12 29) (Z86 39)   · History of Malignant Skin Neoplasm (V10 83)   · History of Prostate cancer screening (V76 44) (Z12 5)   · History of Screen for colon cancer (V76 51) (Z12 11)    The active problems and past medical history were reviewed and updated today  Surgical History    · History of Cataract Surgery   · History of Colonoscopic Polypectomy Via Colostomy   · History of Laminectomy Lumbar   · History of Partial Colectomy - Sigmoid    The surgical history was reviewed and updated today  Family History  Mother    · Family history of Renal failure  Father    · Family history of Lung cancer   · No family history of mental disorder    The family history was reviewed and updated today  Social History    · Being A Social Drinker   · Denies alcohol use causing problems   · Former smoker (V15 82) (F28 189)   · Marital History - Currently    · Occupation: Retired   · Sedentary Lifestyle (V69 0)  The social history was reviewed and updated today  The social history was reviewed and is unchanged  Current Meds   1  Advair -21 MCG/ACT Inhalation Aerosol; INHALE 2 PUFFS,  BY MOUTH, TWICE   DAILY Recorded   2  Allopurinol 300 MG Oral Tablet; take 1 tablet by mouth daily as directed; Therapy: 71WHJ0205 to (Angie Mason)  Requested for: 51UZA2677; Last   Rx:10Mar2017 Ordered   3  Atorvastatin Calcium 10 MG Oral Tablet; TAKE 1 TABLET DAILY;    Therapy: 85TWW1663 to (Angie Mason)  Requested for: 32VNI1594; Last   Rx:10Mar2017 Ordered   4  HydroCHLOROthiazide 25 MG Oral Tablet; TAKE 1 TABLET DAILY; Therapy: 76TXO2719 to (Astrid Mccauley)  Requested for: 46EXY0297; Last   Rx:10Mar2017 Ordered   5  Hydrocodone-Acetaminophen 5-325 MG Oral Tablet; TAKE 1 TABLET EVERY 4 TO 6   HOURS AS NEEDED FOR PAIN;   Therapy: 51Yrt8929 to (Evaluate:22Apr2017); Last Rx:22Tlg1501 Ordered   6  Ipratropium-Albuterol 0 5-2 5 (3) MG/3ML Inhalation Solution; USE 1 UNIT DOSE IN   NEBULIZER EVERY 4 HOURS AS NEEDED Recorded   7  Klor-Con M20 20 MEQ Oral Tablet Extended Release; TAKE 1 TABLET 3 TIMES DAILY; Therapy: (Rajesh Acre) to Recorded   8  LORazepam 0 5 MG Oral Tablet; 1/2-1 tablet a m  and p m; Therapy: 30ZOH1442 to (Evaluate:10Nov2016); Last Rx:11Oct2016 Ordered   9  Magnesium Oxide 400 MG Oral Tablet; TAKE 1 TABLET 3 times daily; Therapy: (Rajesh Acre) to Recorded   10  Nasonex SUSP; USE 2 SPRAYS IN EACH NOSTRIL ONCE DAILY Recorded   11  TraMADol HCl - 50 MG Oral Tablet; TAKE 1 TABLET Every 6 hours; Therapy: 55WJZ2290 to (Evaluate:22Oct2016); Last Rx:57Mto2065 Ordered    The medication list was reviewed and updated today  Allergies    1  Augmentin TABS   2  Theophyllines    Immunizations   1 2    Influenza  16-Oct-2015 04-Nov-2016    PCV  04-Jan-2016     PPSV  25-Oct-2004 15-Yvon-2009    Td/DT  18-Aug-2006     Zoster  03-Dec-2013      Vitals  Signs    Heart Rate: 88  Systolic: 563, LUE, Sitting  Diastolic: 80, LUE, Sitting  Height: 5 ft 6 in  Weight: 141 lb   BMI Calculated: 22 76  BSA Calculated: 1 72    Physical Exam    Constitutional   General appearance: Abnormal   chronically ill and uncomfortable  Head and Face   Head and face: Normal     Palpation of the face and sinuses: No sinus tenderness  Eyes   Conjunctiva and lids: No erythema, swelling or discharge  Pupils and irises: Equal, round, reactive to light      Ears, Nose, Mouth, and Throat   External inspection of ears and nose: Normal     Otoscopic examination: Tympanic membranes translucent with normal light reflex  Canals patent without erythema  Hearing: Normal     Nasal mucosa, septum, and turbinates: Normal without edema or erythema  Lips, teeth, and gums: Normal, good dentition  Oropharynx: Normal with no erythema, edema, exudate or lesions  Neck   Neck: Supple, symmetric, trachea midline, no masses  Thyroid: Normal, no thyromegaly  Pulmonary   Auscultation of lungs: Abnormal   no rales or crackles were heard bilaterally  no rhonchi  no friction rub  no wheezing  Cardiovascular   Auscultation of heart: Normal rate and rhythm, normal S1 and S2, no murmurs  Carotid pulses: 2+ bilaterally  Abdominal aorta: Normal     Femoral pulses: 2+ bilaterally  Pedal pulses: 2+ bilaterally  Peripheral vascular exam: Normal     Examination of extremities for edema and/or varicosities: Normal     Abdomen   Abdomen: Non-tender, no masses  Liver and spleen: No hepatomegaly or splenomegaly  Examination for hernias: No hernias appreciated  Musculoskeletal   Gait and station: Normal     Skin   Skin and subcutaneous tissue: Abnormal   Radiation changes right axilla skin changes right lateral neck  Results/Data  PHQ-2 Adult Depression Screening 80Rhb5113 12:51PM Nirali Gross     Test Name Result Flag Reference   PHQ-2 Adult Depression Score 0     Over the last two weeks, how often have you been bothered by any of the following problems? Little interest or pleasure in doing things: Not at all - 0  Feeling down, depressed, or hopeless: Not at all - 0   PHQ-2 Adult Depression Screening Negative       Falls Risk Assessment (Dx Z13 89 Screen for Neurologic Disorder) 91Ogg1011 12:51PM Nirali Gross     Test Name Result Flag Reference   Falls Risk      No falls in the past year       Procedure    Procedure:   Results: 20/30 in both eyes without corrective device   The patient was cooperative        Health Management  Chronic obstructive pulmonary disease   Complete PFT; every 1 year; Next Due: 90XUG2554; Overdue  History of Screen for colon cancer   COLONOSCOPY; every 3 years; Last 62RHZ5428; Next Due: 69Ntc4969; Near Due  Health Maintenance   Medicare Annual Wellness Visit; every 1 year; Next Due: [id number];  Overdue    Signatures   Electronically signed by : Arty Dandy, M D ; Aug 29 2017  1:21PM EST                       (Author)

## 2018-01-14 VITALS
HEIGHT: 66 IN | TEMPERATURE: 97.5 F | HEART RATE: 78 BPM | DIASTOLIC BLOOD PRESSURE: 80 MMHG | RESPIRATION RATE: 16 BRPM | BODY MASS INDEX: 23.95 KG/M2 | SYSTOLIC BLOOD PRESSURE: 140 MMHG | WEIGHT: 149.04 LBS

## 2018-01-14 VITALS
HEIGHT: 66 IN | SYSTOLIC BLOOD PRESSURE: 144 MMHG | TEMPERATURE: 98.4 F | WEIGHT: 150.05 LBS | BODY MASS INDEX: 24.11 KG/M2 | HEART RATE: 76 BPM | DIASTOLIC BLOOD PRESSURE: 78 MMHG | RESPIRATION RATE: 16 BRPM

## 2018-01-14 VITALS
HEIGHT: 66 IN | SYSTOLIC BLOOD PRESSURE: 142 MMHG | BODY MASS INDEX: 23.63 KG/M2 | DIASTOLIC BLOOD PRESSURE: 80 MMHG | TEMPERATURE: 98.5 F | WEIGHT: 147 LBS

## 2018-01-22 VITALS
DIASTOLIC BLOOD PRESSURE: 74 MMHG | RESPIRATION RATE: 18 BRPM | HEIGHT: 66 IN | TEMPERATURE: 97.1 F | WEIGHT: 133 LBS | SYSTOLIC BLOOD PRESSURE: 126 MMHG | BODY MASS INDEX: 21.38 KG/M2 | HEART RATE: 90 BPM

## 2018-01-22 VITALS
DIASTOLIC BLOOD PRESSURE: 72 MMHG | HEIGHT: 66 IN | BODY MASS INDEX: 22.66 KG/M2 | SYSTOLIC BLOOD PRESSURE: 146 MMHG | WEIGHT: 141 LBS | TEMPERATURE: 97.8 F

## 2018-01-22 VITALS
HEIGHT: 66 IN | WEIGHT: 141 LBS | HEART RATE: 88 BPM | BODY MASS INDEX: 22.66 KG/M2 | SYSTOLIC BLOOD PRESSURE: 140 MMHG | DIASTOLIC BLOOD PRESSURE: 80 MMHG

## 2018-01-23 NOTE — MISCELLANEOUS
Message   Recorded as Task   Date: 01/03/2018 04:45 AM, Created By: Candice Navarro   Task Name: Follow Up   Assigned To: Candice Navarro   Regarding Patient: Gloria Lacey, Status: Active   Comment:    Candice Navarro - 03 Jan 2018 4:45 AM     TASK Burma Smoke,  This patient was in the office yesterday, he was one of Peter's patients  He has metastatic squamous cell cancer of the right upper extremity which appears to be progressing along with a persistent open wound of the right axilla  He sees medical Oncology, Radiation Oncology and wound care on a regular basis  He did have a recent PET scan demonstrating worsening of his disease  The patient was here with his wife and it is becoming increasingly difficult for her to care for him  They do have [facility] coming to the house 1 to 2 times a week but they feel that they need more care  He states that his plastic surgeon at wound care predicted his life expectancy is about 2 years  He is not sure how aggressive he wants to continue to be his treatment  He is seening Oncology next week  The patient is interested in hospice but I am not sure if he is at that level yet  I was not sure if palliative care would be an option for him to get more assistance at home  Could you please check with [facility] to see what additional services might be an option for him or if hospice is an option at this point  Thank you very much  Luda Fowler - 05 Jan 2018 4:02 PM     TASK EDITED   Luda Baltazar - 05 Jan 2018 4:06 PM     TASK REPLIED TO: Previously Assigned To Luda Fowler  I spoke to this family and she has everything that she needed  Hospice is on board and [facility] provided a bed  She has medication arriving tomorrow, home care and she denies any current needs  I provided her with my contact number if she needs further assistance  JG        Active Problems    1  Allergic rhinitis (477 9) (J30 9)   2  Asthma (493 90) (J45 909)   3   Axillary mass (782  2) (R22 30)   4  Benign essential hypertension (401 1) (I10)   5  Benign localized hyperplasia of prostate without urinary obstruction and other lower   urinary tract symptoms (LUTS) (600 20) (N40 0)   6  Chronic obstructive pulmonary disease (496) (J44 9)   7  Hearing Loss (389 9)   8  Hyperlipidemia (272 4) (E78 5)   9  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)   10  Hypokalemia (276 8) (E87 6)   11  Metastatic squamous cell carcinoma (199 1) (C79 9,C80 1)   12  Open wound of axillary region (880 02) (S41 109A)   13  Postherpetic neuralgia (053 19) (B02 29)   14  Prostate cancer screening (V76 44) (Z12 5)   15  Right inguinal hernia (550 90) (K40 90)   16  Seroma, post-traumatic (729 91) (T79 2XXA)   17  Thyroid Nodule    Current Meds   1  Advair -21 MCG/ACT Inhalation Aerosol; INHALE 2 PUFFS,  BY MOUTH, TWICE   DAILY Recorded   2  Allopurinol 300 MG Oral Tablet; take 1 tablet by mouth daily as directed; Therapy: 67JZY1141 to (Evaluate:73Xae8071)  Requested for: 86ZUH0265; Last   Rx:02Jan2018 Ordered   3  Atorvastatin Calcium 10 MG Oral Tablet (Lipitor); TAKE 1 TABLET DAILY; Therapy: 45HTM5417 to (Evaluate:41Ckc9855)  Requested for: 56BXC0470; Last   Rx:02Jan2018 Ordered   4  HydroCHLOROthiazide 25 MG Oral Tablet; TAKE 1 TABLET DAILY; Therapy: 67KPA2943 to (Evaluate:43Rge9553)  Requested for: 82UCR5741; Last   Rx:02Jan2018 Ordered   5  Ipratropium-Albuterol 0 5-2 5 (3) MG/3ML Inhalation Solution; USE 1 UNIT DOSE IN   NEBULIZER EVERY 4 HOURS AS NEEDED Recorded   6  Klor-Con M20 20 MEQ Oral Tablet Extended Release; TAKE 1 TABLET 3 TIMES DAILY; Therapy: (Gearldine Ratel) to Recorded   7  Magnesium Oxide 400 MG Oral Tablet; TAKE 1 TABLET 3 times daily; Therapy: (Gearldine Ratel) to Recorded   8  Mometasone Furoate 50 MCG/ACT Nasal Suspension (Nasonex); USE 2 SPRAYS IN   EACH NOSTRIL ONCE DAILY  Requested for: 65FGD2510;  Last Rx:02Jan2018 Ordered 9  Oxycodone-Acetaminophen 5-325 MG Oral Tablet; TAKE 2 TABLET Every 6 hours; Last   Rx:02Jan2018 Ordered   10  Tamsulosin HCl - 0 4 MG Oral Capsule; TAKE 1 CAPSULE Daily; Therapy: 81VOJ6667 to (Last Rx:02Jan2018)  Requested for: 02Jan2018 Ordered    Allergies    1  Augmentin TABS   2   Theophyllines    Signatures   Electronically signed by : Giovanni Ackerman DO; Jan 8 2018  5:09AM EST                       (Author)

## 2018-01-23 NOTE — MISCELLANEOUS
Message   Recorded as Task   Date: 01/05/2018 11:44 AM, Created By: Elier Dorado   Task Name: Hospital Call   Assigned To: Karen Wahl   Regarding Patient: Alexander Michele, Status: Active   CommentManuela Jasso - 05 Jan 2018 11:44 AM     TASK CREATED  Caller: Renae George; [facility] - [facility] Call  Renae George from [address] called to ask you to follow patient  She also said the Percocet was not helping his pain so they changed him to Mscotin 45MG 2 every 12hrs and Morphine IR 15MG 1 tablet every 3 hours PRN for breakthrough pain  They are going to send over scripts for you to approve  [address] # [phone]        Active Problems    1  Allergic rhinitis (477 9) (J30 9)   2  Asthma (493 90) (J45 909)   3  Axillary mass (782 2) (R22 30)   4  Benign essential hypertension (401 1) (I10)   5  Benign localized hyperplasia of prostate without urinary obstruction and other lower   urinary tract symptoms (LUTS) (600 20) (N40 0)   6  Chronic obstructive pulmonary disease (496) (J44 9)   7  Hearing Loss (389 9)   8  Hyperlipidemia (272 4) (E78 5)   9  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)   10  Hypokalemia (276 8) (E87 6)   11  Metastatic squamous cell carcinoma (199 1) (C79 9,C80 1)   12  Open wound of axillary region (880 02) (S41 109A)   13  Postherpetic neuralgia (053 19) (B02 29)   14  Prostate cancer screening (V76 44) (Z12 5)   15  Right inguinal hernia (550 90) (K40 90)   16  Seroma, post-traumatic (729 91) (T79 2XXA)   17  Thyroid Nodule    Current Meds   1  Advair -21 MCG/ACT Inhalation Aerosol; INHALE 2 PUFFS,  BY MOUTH, TWICE   DAILY Recorded   2  Allopurinol 300 MG Oral Tablet; take 1 tablet by mouth daily as directed; Therapy: 46ACZ7779 to (Evaluate:44Gje0408)  Requested for: 20XBZ6763; Last   Rx:02Jan2018 Ordered   3  Atorvastatin Calcium 10 MG Oral Tablet (Lipitor); TAKE 1 TABLET DAILY;    Therapy: 91JEG4502 to (Evaluate:43Eba1931)  Requested for: 34MHM5716; Last   Rx:02Jan2018 Ordered   4  HydroCHLOROthiazide 25 MG Oral Tablet; TAKE 1 TABLET DAILY; Therapy: 02EBB4206 to (Evaluate:91Jxs3398)  Requested for: 34OZJ6906; Last   Rx:02Jan2018 Ordered   5  Ipratropium-Albuterol 0 5-2 5 (3) MG/3ML Inhalation Solution; USE 1 UNIT DOSE IN   NEBULIZER EVERY 4 HOURS AS NEEDED Recorded   6  Klor-Con M20 20 MEQ Oral Tablet Extended Release; TAKE 1 TABLET 3 TIMES DAILY; Therapy: (José Manuel Harder) to Recorded   7  Magnesium Oxide 400 MG Oral Tablet; TAKE 1 TABLET 3 times daily; Therapy: (José Manuel Harder) to Recorded   8  Mometasone Furoate 50 MCG/ACT Nasal Suspension (Nasonex); USE 2 SPRAYS IN   EACH NOSTRIL ONCE DAILY  Requested for: 59VFS6976; Last Rx:02Jan2018 Ordered   9  Oxycodone-Acetaminophen 5-325 MG Oral Tablet; TAKE 2 TABLET Every 6 hours; Last   Rx:02Jan2018 Ordered   10  Tamsulosin HCl - 0 4 MG Oral Capsule; TAKE 1 CAPSULE Daily; Therapy: 13NXA2365 to (Last Rx:02Jan2018)  Requested for: 02Jan2018 Ordered    Allergies    1  Augmentin TABS   2   Theophyllines    Signatures   Electronically signed by : Dara Villafana DO; Jan 5 2018  3:10PM EST                       (Author)

## 2018-02-06 ENCOUNTER — TELEPHONE (OUTPATIENT)
Dept: FAMILY MEDICINE CLINIC | Facility: CLINIC | Age: 83
End: 2018-02-06

## 2018-02-06 DIAGNOSIS — S41.109D: ICD-10-CM

## 2018-02-06 DIAGNOSIS — C79.9 METASTATIC SQUAMOUS CELL CARCINOMA (HCC): ICD-10-CM

## 2018-02-06 DIAGNOSIS — R22.30 AXILLARY MASS, UNSPECIFIED LATERALITY: Primary | ICD-10-CM

## 2018-02-06 DIAGNOSIS — R69 TERMINAL ILLNESS: ICD-10-CM

## 2018-02-06 PROBLEM — K40.90 RIGHT INGUINAL HERNIA: Status: ACTIVE | Noted: 2017-03-27

## 2018-02-06 PROBLEM — S41.109A OPEN WOUND OF AXILLARY REGION: Status: ACTIVE | Noted: 2017-10-13

## 2018-02-06 PROBLEM — T79.2XXA SEROMA, POST-TRAUMATIC (HCC): Status: ACTIVE | Noted: 2017-06-26

## 2018-02-06 RX ORDER — FENTANYL 75 UG/H
1 PATCH TRANSDERMAL
Qty: 5 PATCH | Refills: 0 | Status: SHIPPED | OUTPATIENT
Start: 2018-02-06

## 2018-02-06 NOTE — TELEPHONE ENCOUNTER
I will discontinue the MS Contin and change him to the Fentanyl Patch as ordered per hospice recommendations

## 2018-02-06 NOTE — TELEPHONE ENCOUNTER
KEN FROM  HOSPICE CALLED ABOUT PT AND SAID HE IS HAVING TROUBLE SWALLOWING MORPHINE PILLS 45MG AND WANTS TO KNOW IF HE CAN TAKE FENANOL PATCH 76 MCG PER HOUR AND ITS ONE PATCH EVERY 67 HRS, THIS IS WHAT [facility] ADVISED, IF YOU CAN DO THIS CAN YOU CALL IT IN TO [facility], [address] [phone]

## 2018-02-06 NOTE — TELEPHONE ENCOUNTER
I spoke to [place] with [facility] and found out you are more familiar with this patient and that you have been  prescribing MS Contin for him  Dr Lundberg informed and we will forward this message to you  [place] explained that she can take the Fentanyl order verbally and call this into [facility] in [place] who can ship it to patient in 1-2 days, but in the mean time, [facility] will take a FAX prescription since he is a hospice patient and dispense the patches today  Dosage is obviously your call, but [place] said she ran it by the hospice pharmacist and comparatively the 75 mcg per hour is probably about right according to his current usage  DJB  Send this back to me please, so I can return call to [place]

## 2018-02-12 ENCOUNTER — TELEPHONE (OUTPATIENT)
Dept: FAMILY MEDICINE CLINIC | Facility: CLINIC | Age: 83
End: 2018-02-12

## (undated) DEVICE — LIGACLIP MCA MULTIPLE CLIP APPLIERS, 20 MEDIUM CLIPS: Brand: LIGACLIP

## (undated) DEVICE — GLOVE SRG BIOGEL ECLIPSE 8

## (undated) DEVICE — VIAL DECANTER

## (undated) DEVICE — GLOVE INDICATOR PI UNDERGLOVE SZ 6.5 BLUE

## (undated) DEVICE — CHLORAPREP HI-LITE 26ML ORANGE

## (undated) DEVICE — ADHESIVE SKN CLSR HISTOACRYL FLEX 0.5ML LF

## (undated) DEVICE — SUT VICRYL 3-0 SH 27 IN J416H

## (undated) DEVICE — GLOVE SRG BIOGEL 7

## (undated) DEVICE — GLOVE INDICATOR PI UNDERGLOVE SZ 7.5 BLUE

## (undated) DEVICE — SUT MONOCRYL 4-0 PS-2 27 IN Y426H

## (undated) DEVICE — CONMED ACCESSORY ELECTRODE, FLAT BLADE WITH EXTENDED INSULATION: Brand: CONMED

## (undated) DEVICE — GLOVE SRG BIOGEL 6.5

## (undated) DEVICE — INTENDED FOR TISSUE SEPARATION, AND OTHER PROCEDURES THAT REQUIRE A SHARP SURGICAL BLADE TO PUNCTURE OR CUT.: Brand: BARD-PARKER SAFETY BLADES SIZE 15, STERILE

## (undated) DEVICE — GLOVE INDICATOR PI UNDERGLOVE SZ 8 BLUE